# Patient Record
Sex: FEMALE | Race: WHITE | NOT HISPANIC OR LATINO | Employment: FULL TIME | URBAN - METROPOLITAN AREA
[De-identification: names, ages, dates, MRNs, and addresses within clinical notes are randomized per-mention and may not be internally consistent; named-entity substitution may affect disease eponyms.]

---

## 2019-04-16 ENCOUNTER — OFFICE VISIT (OUTPATIENT)
Dept: URGENT CARE | Facility: CLINIC | Age: 44
End: 2019-04-16
Payer: COMMERCIAL

## 2019-04-16 VITALS
TEMPERATURE: 98 F | DIASTOLIC BLOOD PRESSURE: 82 MMHG | OXYGEN SATURATION: 98 % | RESPIRATION RATE: 16 BRPM | SYSTOLIC BLOOD PRESSURE: 133 MMHG | HEART RATE: 73 BPM

## 2019-04-16 DIAGNOSIS — H10.33 ACUTE BACTERIAL CONJUNCTIVITIS OF BOTH EYES: Primary | ICD-10-CM

## 2019-04-16 PROCEDURE — 99213 OFFICE O/P EST LOW 20 MIN: CPT | Performed by: NURSE PRACTITIONER

## 2019-04-16 RX ORDER — TOBRAMYCIN 3 MG/ML
1 SOLUTION/ DROPS OPHTHALMIC
Qty: 1 BOTTLE | Refills: 0 | Status: SHIPPED | OUTPATIENT
Start: 2019-04-16 | End: 2021-06-22

## 2021-03-26 DIAGNOSIS — Z23 ENCOUNTER FOR IMMUNIZATION: ICD-10-CM

## 2021-06-22 ENCOUNTER — OFFICE VISIT (OUTPATIENT)
Dept: FAMILY MEDICINE CLINIC | Facility: CLINIC | Age: 46
End: 2021-06-22
Payer: COMMERCIAL

## 2021-06-22 VITALS
WEIGHT: 194.6 LBS | HEIGHT: 61 IN | OXYGEN SATURATION: 99 % | HEART RATE: 64 BPM | RESPIRATION RATE: 18 BRPM | BODY MASS INDEX: 36.74 KG/M2 | DIASTOLIC BLOOD PRESSURE: 78 MMHG | TEMPERATURE: 49.6 F | SYSTOLIC BLOOD PRESSURE: 114 MMHG

## 2021-06-22 DIAGNOSIS — R10.84 INTERMITTENT GENERALIZED ABDOMINAL PAIN: Primary | ICD-10-CM

## 2021-06-22 DIAGNOSIS — R11.10 INTERMITTENT VOMITING: ICD-10-CM

## 2021-06-22 DIAGNOSIS — Z76.89 ENCOUNTER TO ESTABLISH CARE: ICD-10-CM

## 2021-06-22 PROBLEM — I73.00 RAYNAUD'S DISEASE: Status: ACTIVE | Noted: 2021-06-22

## 2021-06-22 PROCEDURE — 99204 OFFICE O/P NEW MOD 45 MIN: CPT | Performed by: FAMILY MEDICINE

## 2021-06-22 RX ORDER — FOLIC ACID/MULTIVIT,IRON,MINER .4-18-35
1 TABLET,CHEWABLE ORAL DAILY
COMMUNITY

## 2021-06-22 NOTE — PROGRESS NOTES
Aurora Morales 1975 female MRN: [id number]  Nor[facility] Burnett [address]    Visit to Establish Care: Family Medicine    Assessment/Plan     No problem-specific Assessment & Plan notes found for this encounter  Lora was seen today for establish care  Diagnoses and all orders for this visit:    Intermittent generalized abdominal pain    Intermittent vomiting  -     CBC and differential; Future  -     Comprehensive metabolic panel; Future  -     TSH, 3rd generation with Free T4 reflex; Future  -     Lipid Panel with Direct LDL reflex; Future  -     UA w Reflex to Microscopic w Reflex to Culture -Lab Collect  -     US abdomen complete; Future    Encounter to establish care      Will start work-up with blood work, UA and ultrasound of the abdomen, if initial work-up is unrevealing will consider CT abdomen/pelvis  Advised if an episode recurs to seek emergency care  In addition to the above, the patient was counseled on general preventative health care subjects, including but not limited to:  - Nutrition, healthy weight, aerobic and weight-bearing exercise  - Mental health, social support, and self care  - Advised of the importance of dental hygiene and routine dental visits   - Patient made aware of  services at the office  SUBJECTIVE    HPI:  Aurora Morales is a 50 y o  female who presented to establish care with this family medicine practice  Abdominal pain- Comes on suddenly, feels like bloating, severe pain beneath both ribs and flank, of 4 times this has occurred 2 episodes she has vomited  The next day, they will be very tired  Last episode was in March, first episode was 1 year ago  Starts with some mild pain, then bloating, and then in an instant extremely painful  She has tried hot baths, hunching forward, Tums hasn't helped, nothing helps, also feels very pale, clammy and sweating  One time tried an enema, but didn't get relief from that   Lasted for 4 hours, vomits during this  No pinpoint abdominal pain location, involves entire abdomen and middle of back  Not cramping, severe pain, 8-9/10  No blood in vomit, normal bowel movements, no blood in stools  She has chills, no fevers  Last episode Friday night into Saturday  PHQ-9 Depression Screening    PHQ-9:   Frequency of the following problems over the past two weeks:      Little interest or pleasure in doing things: 0 - not at all  Feeling down, depressed, or hopeless: 0 - not at all  PHQ-2 Score: 0         Review of Systems   Constitutional: Negative for activity change, appetite change, chills, fatigue and fever  HENT: Negative for congestion and sore throat  Eyes: Negative for discharge and redness  Respiratory: Negative for cough, chest tightness, shortness of breath and wheezing  Cardiovascular: Negative for chest pain and palpitations  Gastrointestinal: Negative for abdominal pain, blood in stool, constipation, diarrhea, nausea and vomiting  Genitourinary: Negative for decreased urine volume, difficulty urinating and urgency  Musculoskeletal: Negative for back pain  Skin: Negative for rash  Neurological: Negative for dizziness, light-headedness and headaches  Psychiatric/Behavioral: Negative for dysphoric mood  The patient is not nervous/anxious  Historical Information   History reviewed  No pertinent past medical history  History reviewed  No pertinent surgical history    Family History   Problem Relation Age of Onset    CINTHYA disease Mother     Diabetes Father     Hypertension Father     Cholecystitis Maternal Grandmother      Social History     Socioeconomic History    Marital status: /Civil Union     Spouse name: Not on file    Number of children: Not on file    Years of education: Not on file    Highest education level: Not on file   Occupational History    Not on file   Tobacco Use    Smoking status: Never Smoker    Smokeless tobacco: Never Used   Substance and Sexual Activity    Alcohol use: Not on file    Drug use: Not on file    Sexual activity: Not on file   Other Topics Concern    Not on file   Social History Narrative    Not on file     Social Determinants of Health     Financial Resource Strain:     Difficulty of Paying Living Expenses:    Food Insecurity:     Worried About Running Out of Food in the Last Year:     [address] in the Last Year:    Transportation Needs:     Lack of Transportation (Medical):  Lack of Transportation (Non-Medical):    Physical Activity:     Days of Exercise per Week:     Minutes of Exercise per Session:    Stress:     Feeling of Stress :    Social Connections:     Frequency of Communication with Friends and Family:     Frequency of Social Gatherings with Friends and Family:     Attends [employer] Services:     Active Member of Clubs or Organizations:     Attends Club or Organization Meetings:     Marital Status:    Intimate Partner Violence:     Fear of Current or Ex-Partner:     Emotionally Abused:     Physically Abused:     Sexually Abused:      OB History    No obstetric history on file  Medications:      Current Outpatient Medications:     multivitamin-iron-minerals-folic acid (CENTRUM) chewable tablet, Chew 1 tablet daily, Disp: , Rfl:       Physical Exam:    Physical Exam  Vitals reviewed  Constitutional:       General: She is not in acute distress  Appearance: Normal appearance  She is not ill-appearing, toxic-appearing or diaphoretic  HENT:      Head: Normocephalic and atraumatic  Mouth/Throat:      Comments: Mask in place  Eyes:      General:         Right eye: No discharge  Left eye: No discharge  Extraocular Movements: Extraocular movements intact  Conjunctiva/sclera: Conjunctivae normal    Cardiovascular:      Rate and Rhythm: Normal rate and regular rhythm  Heart sounds: Normal heart sounds  No murmur heard  No friction rub  No gallop  Pulmonary:      Effort: Pulmonary effort is normal  No respiratory distress  Breath sounds: Normal breath sounds  No stridor  No wheezing or rhonchi  Abdominal:      General: Bowel sounds are normal  There is no distension  Palpations: Abdomen is soft  There is no mass  Tenderness: There is no abdominal tenderness  There is no right CVA tenderness, left CVA tenderness, guarding or rebound  Hernia: No hernia is present  Musculoskeletal:         General: No swelling, tenderness or signs of injury  Cervical back: Normal range of motion and neck supple  No rigidity  Skin:     General: Skin is warm  Coloration: Skin is not pale  Findings: No erythema or rash  Neurological:      Mental Status: She is alert and oriented to person, place, and time  Motor: No weakness  Psychiatric:         Mood and Affect: Mood normal          Behavior: Behavior normal             No future appointments        Emilee Jefferson DO  [address] [facility]

## 2021-06-25 ENCOUNTER — APPOINTMENT (OUTPATIENT)
Dept: LAB | Facility: CLINIC | Age: 46
End: 2021-06-25
Payer: COMMERCIAL

## 2021-06-25 DIAGNOSIS — R11.10 INTERMITTENT VOMITING: ICD-10-CM

## 2021-06-25 LAB
ALBUMIN SERPL BCP-MCNC: 3.3 G/DL (ref 3.5–5)
ALP SERPL-CCNC: 48 U/L (ref 46–116)
ALT SERPL W P-5'-P-CCNC: 17 U/L (ref 12–78)
ANION GAP SERPL CALCULATED.3IONS-SCNC: 4 MMOL/L (ref 4–13)
AST SERPL W P-5'-P-CCNC: 13 U/L (ref 5–45)
BACTERIA UR QL AUTO: ABNORMAL /HPF
BASOPHILS # BLD AUTO: 0.05 THOUSANDS/ΜL (ref 0–0.1)
BASOPHILS NFR BLD AUTO: 1 % (ref 0–1)
BILIRUB SERPL-MCNC: 0.39 MG/DL (ref 0.2–1)
BILIRUB UR QL STRIP: NEGATIVE
BUN SERPL-MCNC: 16 MG/DL (ref 5–25)
CALCIUM ALBUM COR SERPL-MCNC: 9.2 MG/DL (ref 8.3–10.1)
CALCIUM SERPL-MCNC: 8.6 MG/DL (ref 8.3–10.1)
CHLORIDE SERPL-SCNC: 111 MMOL/L (ref 100–108)
CHOLEST SERPL-MCNC: 219 MG/DL (ref 50–200)
CLARITY UR: ABNORMAL
CO2 SERPL-SCNC: 25 MMOL/L (ref 21–32)
COLOR UR: ABNORMAL
CREAT SERPL-MCNC: 0.62 MG/DL (ref 0.6–1.3)
EOSINOPHIL # BLD AUTO: 0.24 THOUSAND/ΜL (ref 0–0.61)
EOSINOPHIL NFR BLD AUTO: 3 % (ref 0–6)
ERYTHROCYTE [DISTWIDTH] IN BLOOD BY AUTOMATED COUNT: 14 % (ref 11.6–15.1)
GFR SERPL CREATININE-BSD FRML MDRD: 108 ML/MIN/1.73SQ M
GLUCOSE P FAST SERPL-MCNC: 87 MG/DL (ref 65–99)
GLUCOSE UR STRIP-MCNC: NEGATIVE MG/DL
HCT VFR BLD AUTO: 43.2 % (ref 34.8–46.1)
HDLC SERPL-MCNC: 69 MG/DL
HGB BLD-MCNC: 13.8 G/DL (ref 11.5–15.4)
HGB UR QL STRIP.AUTO: ABNORMAL
HYALINE CASTS #/AREA URNS LPF: ABNORMAL /LPF
IMM GRANULOCYTES # BLD AUTO: 0.02 THOUSAND/UL (ref 0–0.2)
IMM GRANULOCYTES NFR BLD AUTO: 0 % (ref 0–2)
KETONES UR STRIP-MCNC: ABNORMAL MG/DL
LDLC SERPL CALC-MCNC: 134 MG/DL (ref 0–100)
LEUKOCYTE ESTERASE UR QL STRIP: NEGATIVE
LYMPHOCYTES # BLD AUTO: 2.21 THOUSANDS/ΜL (ref 0.6–4.47)
LYMPHOCYTES NFR BLD AUTO: 24 % (ref 14–44)
MCH RBC QN AUTO: 28.9 PG (ref 26.8–34.3)
MCHC RBC AUTO-ENTMCNC: 31.9 G/DL (ref 31.4–37.4)
MCV RBC AUTO: 90 FL (ref 82–98)
MONOCYTES # BLD AUTO: 0.71 THOUSAND/ΜL (ref 0.17–1.22)
MONOCYTES NFR BLD AUTO: 8 % (ref 4–12)
NEUTROPHILS # BLD AUTO: 5.88 THOUSANDS/ΜL (ref 1.85–7.62)
NEUTS SEG NFR BLD AUTO: 64 % (ref 43–75)
NITRITE UR QL STRIP: NEGATIVE
NON-SQ EPI CELLS URNS QL MICRO: ABNORMAL /HPF
NRBC BLD AUTO-RTO: 0 /100 WBCS
PH UR STRIP.AUTO: 6 [PH]
PLATELET # BLD AUTO: 201 THOUSANDS/UL (ref 149–390)
PMV BLD AUTO: 10.8 FL (ref 8.9–12.7)
POTASSIUM SERPL-SCNC: 4 MMOL/L (ref 3.5–5.3)
PROT SERPL-MCNC: 7.1 G/DL (ref 6.4–8.2)
PROT UR STRIP-MCNC: NEGATIVE MG/DL
RBC # BLD AUTO: 4.78 MILLION/UL (ref 3.81–5.12)
RBC #/AREA URNS AUTO: ABNORMAL /HPF
SODIUM SERPL-SCNC: 140 MMOL/L (ref 136–145)
SP GR UR STRIP.AUTO: 1.03 (ref 1–1.03)
TRIGL SERPL-MCNC: 82 MG/DL
TSH SERPL DL<=0.05 MIU/L-ACNC: 1.05 UIU/ML (ref 0.36–3.74)
UROBILINOGEN UR QL STRIP.AUTO: 0.2 E.U./DL
WBC # BLD AUTO: 9.11 THOUSAND/UL (ref 4.31–10.16)
WBC #/AREA URNS AUTO: ABNORMAL /HPF

## 2021-06-25 PROCEDURE — 36415 COLL VENOUS BLD VENIPUNCTURE: CPT

## 2021-06-25 PROCEDURE — 80061 LIPID PANEL: CPT

## 2021-06-25 PROCEDURE — 80053 COMPREHEN METABOLIC PANEL: CPT

## 2021-06-25 PROCEDURE — 81001 URINALYSIS AUTO W/SCOPE: CPT | Performed by: FAMILY MEDICINE

## 2021-06-25 PROCEDURE — 84443 ASSAY THYROID STIM HORMONE: CPT

## 2021-06-25 PROCEDURE — 85025 COMPLETE CBC W/AUTO DIFF WBC: CPT

## 2021-07-01 ENCOUNTER — HOSPITAL ENCOUNTER (OUTPATIENT)
Dept: ULTRASOUND IMAGING | Facility: HOSPITAL | Age: 46
Discharge: HOME | End: 2021-07-01
Payer: COMMERCIAL

## 2021-07-01 DIAGNOSIS — R11.10 INTERMITTENT VOMITING: ICD-10-CM

## 2021-07-01 PROCEDURE — 76700 US EXAM ABDOM COMPLETE: CPT

## 2021-07-07 ENCOUNTER — OFFICE VISIT (OUTPATIENT)
Dept: FAMILY MEDICINE CLINIC | Facility: CLINIC | Age: 46
End: 2021-07-07
Payer: COMMERCIAL

## 2021-07-07 VITALS
RESPIRATION RATE: 18 BRPM | TEMPERATURE: 99 F | SYSTOLIC BLOOD PRESSURE: 132 MMHG | WEIGHT: 198.4 LBS | HEART RATE: 52 BPM | HEIGHT: 61 IN | OXYGEN SATURATION: 99 % | DIASTOLIC BLOOD PRESSURE: 82 MMHG | BODY MASS INDEX: 37.46 KG/M2

## 2021-07-07 DIAGNOSIS — Z12.31 ENCOUNTER FOR SCREENING MAMMOGRAM FOR MALIGNANT NEOPLASM OF BREAST: ICD-10-CM

## 2021-07-07 DIAGNOSIS — R31.21 ASYMPTOMATIC MICROSCOPIC HEMATURIA: ICD-10-CM

## 2021-07-07 DIAGNOSIS — Z00.00 ANNUAL PHYSICAL EXAM: Primary | ICD-10-CM

## 2021-07-07 DIAGNOSIS — R01.1 SYSTOLIC MURMUR: ICD-10-CM

## 2021-07-07 DIAGNOSIS — E78.5 HYPERLIPIDEMIA, UNSPECIFIED HYPERLIPIDEMIA TYPE: ICD-10-CM

## 2021-07-07 DIAGNOSIS — Z12.4 SCREENING FOR CERVICAL CANCER: ICD-10-CM

## 2021-07-07 DIAGNOSIS — Z23 ENCOUNTER FOR IMMUNIZATION: ICD-10-CM

## 2021-07-07 PROCEDURE — 99396 PREV VISIT EST AGE 40-64: CPT | Performed by: FAMILY MEDICINE

## 2021-07-07 PROCEDURE — 1036F TOBACCO NON-USER: CPT | Performed by: FAMILY MEDICINE

## 2021-07-07 PROCEDURE — 3725F SCREEN DEPRESSION PERFORMED: CPT | Performed by: FAMILY MEDICINE

## 2021-07-07 PROCEDURE — 99214 OFFICE O/P EST MOD 30 MIN: CPT | Performed by: FAMILY MEDICINE

## 2021-07-07 PROCEDURE — 90715 TDAP VACCINE 7 YRS/> IM: CPT | Performed by: FAMILY MEDICINE

## 2021-07-07 PROCEDURE — 81001 URINALYSIS AUTO W/SCOPE: CPT | Performed by: FAMILY MEDICINE

## 2021-07-07 PROCEDURE — 90471 IMMUNIZATION ADMIN: CPT | Performed by: FAMILY MEDICINE

## 2021-07-07 NOTE — PROGRESS NOTES
ADULT [facility]NUAL [facility]    NAME: Mary Gibbons  AGE: 50 y o  SEX: female  : 1975     DATE: 2021     Assessment and Plan:     Problem List Items Addressed This Visit        Other    Systolic murmur      Other Visit Diagnoses     Annual physical exam    -  Primary    Encounter for screening mammogram for malignant neoplasm of breast        Relevant Orders    Mammo screening bilateral w 3d & cad    Encounter for immunization        Relevant Orders    TDAP VACCINE GREATER THAN OR EQUAL TO 6YO IM    Screening for cervical cancer        Relevant Orders    Ambulatory referral to Gynecology          Immunizations and preventive care screenings were discussed with patient today  Appropriate education was printed on patient's after visit summary  Counseling:  Alcohol/drug use: discussed moderation in alcohol intake, the recommendations for healthy alcohol use, and avoidance of illicit drug use  Dental Health: discussed importance of regular tooth brushing, flossing, and dental visits  Injury prevention: discussed safety/seat belts, safety helmets, smoke detectors, carbon dioxide detectors, and smoking near bedding or upholstery  Sexual health: discussed sexually transmitted diseases, partner selection, use of condoms, avoidance of unintended pregnancy, and contraceptive alternatives  · Exercise: the importance of regular exercise/physical activity was discussed  Recommend exercise 3-5 times per week for at least 30 minutes  BMI Counseling: Body mass index is 37 49 kg/m²  The BMI is above normal  Nutrition recommendations include decreasing portion sizes, encouraging healthy choices of fruits and vegetables, decreasing fast food intake, consuming healthier snacks, moderation in carbohydrate intake and increasing intake of lean protein   Exercise recommendations include moderate physical activity 150 minutes/week and exercising 3-5 times per week  No follow-ups on file  Chief Complaint:     Chief Complaint   Patient presents with    Follow-up    Physical Exam      History of Present Illness:     Adult Annual Physical   Patient here for a comprehensive physical exam  The patient reports - see problem focused visit  Diet and Physical Activity  · Diet/Nutrition: well balanced diet, frequent junk food and consuming 3-5 servings of fruits/vegetables daily  · Exercise: walking, moderate cardiovascular exercise and 3-4 times a week on average  Depression Screening  PHQ-9 Depression Screening    PHQ-9:   Frequency of the following problems over the past two weeks:      Little interest or pleasure in doing things: 0 - not at all  Feeling down, depressed, or hopeless: 0 - not at all  PHQ-2 Score: 0       General Health  · Sleep: sleeps well  · Hearing: normal - bilateral   · Vision: no vision problems  · Dental: regular dental visits  /GYN Health  · Patient is: premenopausal  · Last menstrual period: LMP 6/24  · Contraceptive method: None,  with vasectomy, follows with GYN  Review of Systems:     Review of Systems - see problem focused visit  Past Medical History:     History reviewed  No pertinent past medical history  Past Surgical History:     History reviewed  No pertinent surgical history     Social History:     Social History     Socioeconomic History    Marital status: /Civil Union     Spouse name: None    Number of children: None    Years of education: None    Highest education level: None   Occupational History    None   Tobacco Use    Smoking status: Never Smoker    Smokeless tobacco: Never Used   Substance and Sexual Activity    Alcohol use: None    Drug use: None    Sexual activity: None   Other Topics Concern    None   Social History Narrative    None     Social Determinants of Health     Financial Resource Strain:     Difficulty of Paying Living Expenses:    Food Insecurity:     Worried About Running Out of Food in the Last Year:     [address] in the Last Year:    Transportation Needs:     Lack of Transportation (Medical):  Lack of Transportation (Non-Medical):    Physical Activity:     Days of Exercise per Week:     Minutes of Exercise per Session:    Stress:     Feeling of Stress :    Social Connections:     Frequency of Communication with Friends and Family:     Frequency of Social Gatherings with Friends and Family:     Attends [employer] Services:     Active Member of Clubs or Organizations:     Attends Club or Organization Meetings:     Marital Status:    Intimate Partner Violence:     Fear of Current or Ex-Partner:     Emotionally Abused:     Physically Abused:     Sexually Abused:       Family History:     Family History   Problem Relation Age of Onset    CINTHYA disease Mother     Diabetes Father     Hypertension Father     Cholecystitis Maternal Grandmother       Current Medications:     Current Outpatient Medications   Medication Sig Dispense Refill    multivitamin-iron-minerals-folic acid (CENTRUM) chewable tablet Chew 1 tablet daily       No current facility-administered medications for this visit  Allergies:     No Known Allergies   Physical Exam:     /82 (BP Location: Left arm, Patient Position: Sitting, Cuff Size: Standard)   Pulse (!) 52   Temp 99 °F (37 2 °C)   Resp 18   Ht 5' 1" (1 549 m)   Wt 90 kg (198 lb 6 4 oz)   LMP 06/24/2021   SpO2 99%   BMI 37 49 kg/m²     Physical Exam - see problem focused visit       [address] [facility]

## 2021-07-07 NOTE — PROGRESS NOTES
Assessment/Plan:       Problem List Items Addressed This Visit        Other    Systolic murmur      Other Visit Diagnoses     Annual physical exam    -  Primary    Encounter for screening mammogram for malignant neoplasm of breast        Relevant Orders    Mammo screening bilateral w 3d & cad    Encounter for immunization        Relevant Orders    TDAP VACCINE GREATER THAN OR EQUAL TO 8YO IM (Completed)    Screening for cervical cancer        Relevant Orders    Ambulatory referral to Gynecology    Hyperlipidemia, unspecified hyperlipidemia type        Asymptomatic microscopic hematuria        Relevant Orders    Urinalysis with microscopic            Subjective:      Patient ID: Marta Garvin is a 50 y o  female  HPI     The patient presents today for follow-up and to review recent results  Blood work reviewed and normal except for as outlined below:     Hyperlipidemia- Chol 219 Tri 82, HDL 69,   ASCVD risk score 0 6%  Diet modification discussed  Prior blood on UA, collected during menstrual period, will collect new sample  LMP 6/24  Will recollect urine sample  Abdominal ultrasound is still pending  No further episodes of abdominal pain  Will call with results  Systolic murmur incidentally noted on exam, asymptomatic  Will monitor at subsequent visits  BP slightly elevated today 132/82, monitor at subsequent visits  The following portions of the patient's history were reviewed and updated as appropriate: allergies, current medications, past family history, past medical history, past social history, past surgical history and problem list     Review of Systems   Constitutional: Negative for activity change, appetite change, chills and fever  Respiratory: Negative for cough, chest tightness, shortness of breath and wheezing  Cardiovascular: Negative for chest pain and palpitations     Gastrointestinal: Negative for abdominal pain, blood in stool, constipation, diarrhea, nausea and vomiting  Genitourinary: Negative for decreased urine volume, difficulty urinating and dysuria  Neurological: Negative for dizziness, light-headedness and headaches  Objective:      /82 (BP Location: Left arm, Patient Position: Sitting, Cuff Size: Standard)   Pulse (!) 52   Temp 99 °F (37 2 °C)   Resp 18   Ht 5' 1" (1 549 m)   Wt 90 kg (198 lb 6 4 oz)   LMP 06/24/2021   SpO2 99%   BMI 37 49 kg/m²          Physical Exam  Vitals reviewed  Constitutional:       General: She is not in acute distress  Appearance: Normal appearance  She is not ill-appearing, toxic-appearing or diaphoretic  HENT:      Head: Normocephalic and atraumatic  Cardiovascular:      Rate and Rhythm: Normal rate and regular rhythm  Heart sounds: Murmur heard  No friction rub  No gallop  Comments: Systolic murmur 2/6  Pulmonary:      Effort: Pulmonary effort is normal  No respiratory distress  Breath sounds: Normal breath sounds  No stridor  No wheezing or rhonchi  Musculoskeletal:      Right lower leg: No edema  Left lower leg: No edema  Skin:     General: Skin is warm  Coloration: Skin is not pale  Findings: No erythema or rash  Neurological:      Mental Status: She is alert and oriented to person, place, and time  Motor: No weakness     Psychiatric:         Mood and Affect: Mood normal          Behavior: Behavior normal            DO Carmella Bermudez [address] [place] [address]

## 2021-07-07 NOTE — PATIENT INSTRUCTIONS
Wellness Visit for Adults   AMBULATORY CARE:   A wellness visit  is when you see your healthcare provider to get screened for health problems  Your healthcare provider will also give you advice on how to stay healthy  Write down your questions so you remember to ask them  Ask your healthcare provider how often you should have a wellness visit  What happens at a wellness visit:  Your healthcare provider will ask about your health, and your family history of health problems  This includes high blood pressure, heart disease, and cancer  He or she will ask if you have symptoms that concern you, if you smoke, and about your mood  You may also be asked about your intake of medicines, supplements, food, and alcohol  Any of the following may be done:  · Your weight  will be checked  Your height may also be checked so your body mass index (BMI) can be calculated  Your BMI shows if you are at a healthy weight  · Your blood pressure  and heart rate will be checked  Your temperature may also be checked  · Blood and urine tests  may be done  Blood tests may be done to check your cholesterol levels  Abnormal cholesterol levels increase your risk for heart disease and stroke  You may also need a blood or urine test to check for diabetes if you are at increased risk  Urine tests may be done to look for signs of an infection or kidney disease  · A physical exam  includes checking your heartbeat and lungs with a stethoscope  Your healthcare provider may also check your skin to look for sun damage  · Screening tests  may be recommended  A screening test is done to check for diseases that may not cause symptoms  The screening tests you may need depend on your age, gender, family history, and lifestyle habits  For example, colorectal screening may be recommended if you are 48years old or older  Screening tests you need if you are a woman:   · A Pap smear  is used to screen for cervical cancer   Pap smears are usually done every 3 to 5 years depending on your age  You may need them more often if you have had abnormal Pap smear test results in the past  Ask your healthcare provider how often you should have a Pap smear  · A mammogram  is an x-ray of your breasts to screen for breast cancer  Experts recommend mammograms every 2 years starting at age 48 years  You may need a mammogram at age 52 years or younger if you have an increased risk for breast cancer  Talk to your healthcare provider about when you should start having mammograms and how often you need them  Vaccines you may need:   · Get an influenza vaccine  every year  The influenza vaccine protects you from the flu  Several types of viruses cause the flu  The viruses change over time, so new vaccines are made each year  · Get a tetanus-diphtheria (Td) booster vaccine  every 10 years  This vaccine protects you against tetanus and diphtheria  Tetanus is a severe infection that may cause painful muscle spasms and lockjaw  Diphtheria is a severe bacterial infection that causes a thick covering in the back of your mouth and throat  · Get a human papillomavirus (HPV) vaccine  if you are female and aged 23 to 32 or male 23 to 24 and never received it  This vaccine protects you from HPV infection  HPV is the most common infection spread by sexual contact  HPV may also cause vaginal, penile, and anal cancers  · Get a pneumococcal vaccine  if you are aged 72 years or older  The pneumococcal vaccine is an injection given to protect you from pneumococcal disease  Pneumococcal disease is an infection caused by pneumococcal bacteria  The infection may cause pneumonia, meningitis, or an ear infection  · Get a shingles vaccine  if you are 60 or older, even if you have had shingles before  The shingles vaccine is an injection to protect you from the varicella-zoster virus  This is the same virus that causes chickenpox   Shingles is a painful rash that develops in people who had chickenpox or have been exposed to the virus  How to eat healthy:  My Plate is a model for planning healthy meals  It shows the types and amounts of foods that should go on your plate  Fruits and vegetables make up about half of your plate, and grains and protein make up the other half  A serving of dairy is included on the side of your plate  The amount of calories and serving sizes you need depends on your age, gender, weight, and height  Examples of healthy foods are listed below:  · Eat a variety of vegetables  such as dark green, red, and orange vegetables  You can also include canned vegetables low in sodium (salt) and frozen vegetables without added butter or sauces  · Eat a variety of fresh fruits , canned fruit in 100% juice, frozen fruit, and dried fruit  · Include whole grains  At least half of the grains you eat should be whole grains  Examples include whole-wheat bread, wheat pasta, brown rice, and whole-grain cereals such as oatmeal     · Eat a variety of protein foods such as seafood (fish and shellfish), lean meat, and poultry without skin (turkey and chicken)  Examples of lean meats include pork leg, shoulder, or tenderloin, and beef round, sirloin, tenderloin, and extra lean ground beef  Other protein foods include eggs and egg substitutes, beans, peas, soy products, nuts, and seeds  · Choose low-fat dairy products such as skim or 1% milk or low-fat yogurt, cheese, and cottage cheese  · Limit unhealthy fats  such as butter, hard margarine, and shortening  Exercise:  Exercise at least 30 minutes per day on most days of the week  Some examples of exercise include walking, biking, dancing, and swimming  You can also fit in more physical activity by taking the stairs instead of the elevator or parking farther away from stores  Include muscle strengthening activities 2 days each week  Regular exercise provides many health benefits   It helps you manage your weight, and decreases your risk for type 2 diabetes, heart disease, stroke, and high blood pressure  Exercise can also help improve your mood  Ask your healthcare provider about the best exercise plan for you  General health and safety guidelines:   · Do not smoke  Nicotine and other chemicals in cigarettes and cigars can cause lung damage  Ask your healthcare provider for information if you currently smoke and need help to quit  E-cigarettes or smokeless tobacco still contain nicotine  Talk to your healthcare provider before you use these products  · Limit alcohol  A drink of alcohol is 12 ounces of beer, 5 ounces of wine, or 1½ ounces of liquor  · Lose weight, if needed  Being overweight increases your risk of certain health conditions  These include heart disease, high blood pressure, type 2 diabetes, and certain types of cancer  · Protect your skin  Do not sunbathe or use tanning beds  Use sunscreen with a SPF 15 or higher  Apply sunscreen at least 15 minutes before you go outside  Reapply sunscreen every 2 hours  Wear protective clothing, hats, and sunglasses when you are outside  · Drive safely  Always wear your seatbelt  Make sure everyone in your car wears a seatbelt  A seatbelt can save your life if you are in an accident  Do not use your cell phone when you are driving  This could distract you and cause an accident  Pull over if you need to make a call or send a text message  · Practice safe sex  Use latex condoms if are sexually active and have more than one partner  Your healthcare provider may recommend screening tests for sexually transmitted infections (STIs)  · Wear helmets, lifejackets, and protective gear  Always wear a helmet when you ride a bike or motorcycle, go skiing, or play sports that could cause a head injury  Wear protective equipment when you play sports  Wear a lifejacket when you are on a boat or doing water sports      © Copyright [employer] 2020 Information is for End User's use only and may not be sold, redistributed or otherwise used for commercial purposes  All illustrations and images included in CareNotes® are the copyrighted property of [employer] M [employer]  or Radha Salas  The above information is an  only  It is not intended as medical advice for individual conditions or treatments  Talk to your doctor, nurse or pharmacist before following any medical regimen to see if it is safe and effective for you  Cholesterol and Your Health   AMBULATORY CARE:   Cholesterol  is a waxy, fat-like substance  Your body uses cholesterol to make hormones and new cells, and to protect nerves  Cholesterol is made by your body  It also comes from certain foods you eat, such as meat and dairy products  Your healthcare provider can help you set goals for your cholesterol levels  He or she can help you create a plan to meet your goals  Cholesterol level goals: Your cholesterol level goals depend on your risk for heart disease, your age, and your other health conditions  The following are general guidelines:  · Total cholesterol  includes low-density lipoprotein (LDL), high-density lipoprotein (HDL), and triglyceride levels  The total cholesterol level should be lower than 200 mg/dL and is best at about 150 mg/dL  · LDL cholesterol  is called bad cholesterol  because it forms plaque in your arteries  As plaque builds up, your arteries become narrow, and less blood flows through  When plaque decreases blood flow to your heart, you may have chest pain  If plaque completely blocks an artery that brings blood to your heart, you may have a heart attack  Plaque can break off and form blood clots  Blood clots may block arteries in your brain and cause a stroke  The level should be less than 130 mg/dL and is best at about 100 mg/dL  · HDL cholesterol  is called good cholesterol  because it helps remove LDL cholesterol from your arteries   It does this by attaching to LDL cholesterol and carrying it to your liver  Your liver breaks down LDL cholesterol so your body can get rid of it  High levels of HDL cholesterol can help prevent a heart attack and stroke  Low levels of HDL cholesterol can increase your risk for heart disease, heart attack, and stroke  The level should be 60 mg/dL or higher  · Triglycerides  are a type of fat that store energy from foods you eat  High levels of triglycerides also cause plaque buildup  This can increase your risk for a heart attack or stroke  If your triglyceride level is high, your LDL cholesterol level may also be high  The level should be less than 150 mg/dL  What increases your risk for high cholesterol:   · Smoking cigarettes    · Being overweight or obese, or not getting enough exercise    · Drinking large amounts of alcohol    · A medical condition such as hypertension (high blood pressure) or diabetes    · Certain genes passed from your parents to you    · Age older than 65 years    What you need to know about having your cholesterol levels checked: Adults 21to 39years of age should have their cholesterol levels checked every 4 to 6 years  Adults 45 years or older should have their cholesterol checked every 1 to 2 years  You may need your cholesterol checked more often, or at a younger age, if you have risk factors for heart disease  You may also need to have your cholesterol checked more often if you have other health conditions, such as diabetes  Blood tests are used to check cholesterol levels  Blood tests measure your levels of triglycerides, LDL cholesterol, and HDL cholesterol  How healthy fats affect your cholesterol levels:  Healthy fats, also called unsaturated fats, help lower LDL cholesterol and triglyceride levels  Healthy fats include the following:  · Monounsaturated fats  are found in foods such as olive oil, canola oil, avocado, nuts, and olives      · Polyunsaturated fats,  such as omega 3 fats, are found in fish, such as salmon, trout, and tuna  They can also be found in plant foods such as flaxseed, walnuts, and soybeans  How unhealthy fats affect your cholesterol levels:  Unhealthy fats increase LDL cholesterol and triglyceride levels  They are found in foods high in cholesterol, saturated fat, and trans fat:  · Cholesterol  is found in eggs, dairy, and meat  · Saturated fat  is found in butter, cheese, ice cream, whole milk, and coconut oil  Saturated fat is also found in meat, such as sausage, hot dogs, and bologna  · Trans fat  is found in liquid oils and is used in fried and baked foods  Foods that contain trans fats include chips, crackers, muffins, sweet rolls, microwave popcorn, and cookies  Treatment  for high cholesterol will also decrease your risk of heart disease, heart attack, and stroke  Treatment may include any of the following:  · Lifestyle changes  may include food, exercise, weight loss, and quitting smoking  You may also need to decrease the amount of alcohol you drink  Your healthcare provider will want you to start with lifestyle changes  Other treatment may be added if lifestyle changes are not enough  · Medicines  may be given to lower your LDL cholesterol, triglyceride levels, or total cholesterol level  You may need medicines to lower your cholesterol if any of the following is true:     ? You have a history of stroke, TIA, unstable angina, or a heart attack  ? Your LDL cholesterol level is 190 mg/dL or higher  ? You are age 36 to 76 years, have diabetes or heart disease risk factors, and your LDL cholesterol is 70 mg/dL or higher  · Supplements  include fish oil, red yeast rice, and garlic  Fish oil may help lower your triglyceride and LDL cholesterol levels  It may also increase your HDL cholesterol level  Red yeast rice may help decrease your total cholesterol level and LDL cholesterol level  Garlic may help lower your total cholesterol level   Do not take these supplements without talking to your healthcare provider  Food changes you can make to lower your cholesterol levels:  A dietitian can help you create a healthy eating plan  He or she can show you how to read food labels and choose foods low in saturated fat, trans fats, and cholesterol  · Decrease the total amount of fat you eat  Choose lean meats, fat-free or 1% fat milk, and low-fat dairy products, such as yogurt and cheese  Try to limit or avoid red meats  Limit or do not eat fried foods or baked goods, such as cookies  · Replace unhealthy fats with healthy fats  Cook foods in olive oil or canola oil  Choose soft margarines that are low in saturated fat and trans fat  Seeds, nuts, and avocados are other examples of healthy fats  · Eat foods with omega-3 fats  Examples include salmon, tuna, mackerel, walnuts, and flaxseed  Eat fish 2 times per week  Pregnant women should not eat fish that have high levels of mercury, such as shark, swordfish, and mariana mackerel  · Increase the amount of high-fiber foods you eat  High-fiber foods can help lower your LDL cholesterol  Aim to get between 20 and 30 grams of fiber each day  Fruits and vegetables are high in fiber  Eat at least 5 servings each day  Other high-fiber foods are whole-grain or whole-wheat breads, pastas, or cereals, and brown rice  Eat 3 ounces of whole-grain foods each day  Increase fiber slowly  You may have abdominal discomfort, bloating, and gas if you add fiber to your diet too quickly  · Eat healthy protein foods  Examples include low-fat dairy products, skinless chicken and turkey, fish, and nuts  · Limit foods and drinks that are high in sugar  Your dietitian or healthcare provider can help you create daily limits for high-sugar foods and drinks  The limit may be lower if you have diabetes or another health condition  Limits can also help you lose weight if needed    Lifestyle changes you can make to lower your cholesterol levels:   · Maintain a healthy weight  Ask your healthcare provider what a healthy weight is for you  Ask him or her to help you create a weight loss plan if needed  Weight loss can decrease your total cholesterol and triglyceride levels  Weight loss may also help keep your blood pressure at a healthy level  · Exercise regularly  Exercise can help lower your total cholesterol level and maintain a healthy weight  Exercise can also help increase your HDL cholesterol level  Work with your healthcare provider to create an exercise program that is right for you  Get at least 30 to 40 minutes of moderate exercise most days of the week  Examples of exercise include brisk walking, swimming, or biking  · Do not smoke  Nicotine and other chemicals in cigarettes and cigars can raise your cholesterol levels  Ask your healthcare provider for information if you currently smoke and need help to quit  E-cigarettes or smokeless tobacco still contain nicotine  Talk to your healthcare provider before you use these products  · Limit or do not drink alcohol  Alcohol can increase your triglyceride levels  Ask your healthcare provider before you drink alcohol  Ask how much is okay for you to drink in 1 day or 1 week  © Copyright [address] Information is for End User's use only and may not be sold, redistributed or otherwise used for commercial purposes  All illustrations and images included in CareNotes® are the copyrighted property of [employer] M , Inc  or [address] [place], [place] [id number]   The above information is an  only  It is not intended as medical advice for individual conditions or treatments  Talk to your doctor, nurse or pharmacist before following any medical regimen to see if it is safe and effective for you

## 2021-07-08 LAB
BACTERIA UR QL AUTO: NORMAL /HPF
BILIRUB UR QL STRIP: NEGATIVE
CLARITY UR: CLEAR
COLOR UR: YELLOW
GLUCOSE UR STRIP-MCNC: NEGATIVE MG/DL
HGB UR QL STRIP.AUTO: NEGATIVE
HYALINE CASTS #/AREA URNS LPF: NORMAL /LPF
KETONES UR STRIP-MCNC: NEGATIVE MG/DL
LEUKOCYTE ESTERASE UR QL STRIP: NEGATIVE
NITRITE UR QL STRIP: NEGATIVE
NON-SQ EPI CELLS URNS QL MICRO: NORMAL /HPF
PH UR STRIP.AUTO: 6 [PH]
PROT UR STRIP-MCNC: NEGATIVE MG/DL
RBC #/AREA URNS AUTO: NORMAL /HPF
SP GR UR STRIP.AUTO: 1.01 (ref 1–1.03)
UROBILINOGEN UR QL STRIP.AUTO: 0.2 E.U./DL
WBC #/AREA URNS AUTO: NORMAL /HPF

## 2021-07-12 DIAGNOSIS — K80.20 CALCULUS OF GALLBLADDER WITHOUT CHOLECYSTITIS WITHOUT OBSTRUCTION: Primary | ICD-10-CM

## 2021-07-12 DIAGNOSIS — R11.10 INTERMITTENT VOMITING: ICD-10-CM

## 2021-07-12 DIAGNOSIS — R10.84 INTERMITTENT GENERALIZED ABDOMINAL PAIN: ICD-10-CM

## 2021-07-14 ENCOUNTER — CONSULT (OUTPATIENT)
Dept: SURGERY | Facility: CLINIC | Age: 46
End: 2021-07-14
Payer: COMMERCIAL

## 2021-07-14 VITALS
RESPIRATION RATE: 16 BRPM | WEIGHT: 196 LBS | DIASTOLIC BLOOD PRESSURE: 70 MMHG | BODY MASS INDEX: 37 KG/M2 | HEIGHT: 61 IN | HEART RATE: 74 BPM | TEMPERATURE: 98.8 F | SYSTOLIC BLOOD PRESSURE: 122 MMHG

## 2021-07-14 DIAGNOSIS — R10.84 INTERMITTENT GENERALIZED ABDOMINAL PAIN: ICD-10-CM

## 2021-07-14 DIAGNOSIS — K80.50 RECURRENT BILIARY COLIC: Primary | ICD-10-CM

## 2021-07-14 DIAGNOSIS — K80.20 CALCULUS OF GALLBLADDER WITHOUT CHOLECYSTITIS WITHOUT OBSTRUCTION: ICD-10-CM

## 2021-07-14 DIAGNOSIS — R11.10 INTERMITTENT VOMITING: ICD-10-CM

## 2021-07-14 PROCEDURE — 99204 OFFICE O/P NEW MOD 45 MIN: CPT | Performed by: SURGERY

## 2021-07-14 PROCEDURE — 1036F TOBACCO NON-USER: CPT | Performed by: SURGERY

## 2021-07-14 PROCEDURE — 3008F BODY MASS INDEX DOCD: CPT | Performed by: SURGERY

## 2021-07-14 RX ORDER — CHLORHEXIDINE GLUCONATE 4 G/100ML
SOLUTION TOPICAL DAILY PRN
Status: CANCELLED | OUTPATIENT
Start: 2021-08-06

## 2021-07-14 RX ORDER — SODIUM CHLORIDE, SODIUM LACTATE, POTASSIUM CHLORIDE, CALCIUM CHLORIDE 600; 310; 30; 20 MG/100ML; MG/100ML; MG/100ML; MG/100ML
125 INJECTION, SOLUTION INTRAVENOUS CONTINUOUS
Status: CANCELLED | OUTPATIENT
Start: 2021-08-06

## 2021-07-14 RX ORDER — HEPARIN SODIUM 5000 [USP'U]/ML
5000 INJECTION, SOLUTION INTRAVENOUS; SUBCUTANEOUS ONCE
Status: CANCELLED | OUTPATIENT
Start: 2021-08-06

## 2021-07-14 RX ORDER — CEFAZOLIN SODIUM 2 G/50ML
2000 SOLUTION INTRAVENOUS ONCE
Status: CANCELLED | OUTPATIENT
Start: 2021-08-06 | End: 2021-07-14

## 2021-07-15 NOTE — PROGRESS NOTES
H&P Exam - General Surgery   Lora Sow 50 y o  female MRN: [id number]   Encounter: [id number]    Assessment/Plan     45F with escalating biliary colic and ultrasound findings of cholelithiasis  Her clinical history is very consistent with gallbladder disease and we have offered her an elective laparoscopic cholecystectomy  We discussed the operation in detail with her and her  including the risks and benefits  The patient is interested in proceeding and informed consent was signed  She will follow a low fat diet in the mean time and call our office with recurrent symptoms  I reviewed her ultrasound imaging directly and discussed it with radiology, the liver is mildly enlarged and there are multiple liver cysts identified in addition to the gallstones  No other acute pathology noted  The patient was updated regarding the ultrasound findings and reassured that the liver findings are not worrisome at this point  History of Present Illness   Chief Complaint   Patient presents with    Abdominal Pain     gallstones and enlarged liver     Mrs Maverick Robles is a 50year old healthy female who is referred to me to be evaluated for biliary colic  She explains 4-5 episodes in the last year that are all similar to each other and have been increasing in frequency  She notes them in the evenings when she is laying in bed and she gets a constellation of symptoms that include upper abdominal pain radiating to the back with abdominal bloating, nausea, diaphoresis, and occasional vomiting  She will have chills and be unable to get comfortable during that time  It usually lasts 1-2 hours and she feels unwell for the next few hours  She has not gone to the emergency room for these symptoms  Her PCP ordered an outpatient ultrasound that I reviewed directly and it shows a contracted gallbladder with gallstones  She had eaten shortly before the images were obtained   She has incidentally noted liver cysts and a mildly enlarged liver as well  Denies reflux, heartburn  Never had prior colonoscopy or other GI evaluation  Maternal grandmother had gallbladder disease and mother has GERD and had intestinal surgery for malrotation  The patient has had no prior abdominal operations  Her last pain episode was in June  She has anxiety regarding recurrence of symptoms and is eager to proceed with cholecystectomy to prevent future pain episodes  Abdominal Pain  Associated symptoms include nausea and vomiting  Review of Systems   Constitutional: Positive for chills and diaphoresis  Gastrointestinal: Positive for abdominal pain, nausea and vomiting  All other systems reviewed and are negative  Historical Information   History reviewed  No pertinent past medical history  History reviewed  No pertinent surgical history  Social History   Social History     Substance and Sexual Activity   Alcohol Use None     Social History     Substance and Sexual Activity   Drug Use Not on file     Social History     Tobacco Use   Smoking Status Never Smoker   Smokeless Tobacco Never Used     Family History   Problem Relation Age of Onset    CINTHYA disease Mother     Diabetes Father     Hypertension Father     Cholecystitis Maternal Grandmother        Meds/Allergies     Current Outpatient Medications:     multivitamin-iron-minerals-folic acid (CENTRUM) chewable tablet, Chew 1 tablet daily, Disp: , Rfl:   No Known Allergies    The following portions of the patient's history were reviewed and updated as appropriate: allergies, current medications, past family history, past medical history, past social history, past surgical history and problem list     Objective   Current Vitals:   Blood pressure 122/70, pulse 74, temperature 98 8 °F (37 1 °C), resp  rate 16, height 5' 1" (1 549 m), weight 88 9 kg (196 lb), last menstrual period 06/24/2021  Physical Exam  Vitals and nursing note reviewed  Constitutional:       Appearance: Normal appearance  HENT:      Head: Normocephalic and atraumatic  Mouth/Throat:      Mouth: Mucous membranes are dry  Cardiovascular:      Rate and Rhythm: Normal rate  Pulmonary:      Effort: Pulmonary effort is normal  No respiratory distress  Abdominal:      General: There is no distension  Palpations: Abdomen is soft  There is no mass  Tenderness: There is no abdominal tenderness  There is no guarding or rebound  Hernia: No hernia is present  Skin:     General: Skin is warm and dry  Coloration: Skin is not jaundiced  Neurological:      General: No focal deficit present  Mental Status: She is alert and oriented to person, place, and time  Psychiatric:         Mood and Affect: Mood normal          Behavior: Behavior normal          Signature:   Maggy Max MD  Date: 7/15/2021 Time: 10:13 AM

## 2021-07-15 NOTE — H&P
H&P Exam - General Surgery   Lora Juares 50 y o  female MRN: [id number]   Encounter: [id number]    Assessment/Plan     45F with escalating biliary colic and ultrasound findings of cholelithiasis  Her clinical history is very consistent with gallbladder disease and we have offered her an elective laparoscopic cholecystectomy  We discussed the operation in detail with her and her  including the risks and benefits  The patient is interested in proceeding and informed consent was signed  She will follow a low fat diet in the mean time and call our office with recurrent symptoms  I reviewed her ultrasound imaging directly and discussed it with radiology, the liver is mildly enlarged and there are multiple liver cysts identified in addition to the gallstones  No other acute pathology noted  The patient was updated regarding the ultrasound findings and reassured that the liver findings are not worrisome at this point  History of Present Illness   Chief Complaint   Patient presents with    Abdominal Pain     gallstones and enlarged liver     Mrs Basil Stevens is a 50year old healthy female who is referred to me to be evaluated for biliary colic  She explains 4-5 episodes in the last year that are all similar to each other and have been increasing in frequency  She notes them in the evenings when she is laying in bed and she gets a constellation of symptoms that include upper abdominal pain radiating to the back with abdominal bloating, nausea, diaphoresis, and occasional vomiting  She will have chills and be unable to get comfortable during that time  It usually lasts 1-2 hours and she feels unwell for the next few hours  She has not gone to the emergency room for these symptoms  Her PCP ordered an outpatient ultrasound that I reviewed directly and it shows a contracted gallbladder with gallstones  She had eaten shortly before the images were obtained   She has incidentally noted liver cysts and a mildly enlarged liver as well  Denies reflux, heartburn  Never had prior colonoscopy or other GI evaluation  Maternal grandmother had gallbladder disease and mother has GERD and had intestinal surgery for malrotation  The patient has had no prior abdominal operations  Her last pain episode was in June  She has anxiety regarding recurrence of symptoms and is eager to proceed with cholecystectomy to prevent future pain episodes  Abdominal Pain  Associated symptoms include nausea and vomiting  Review of Systems   Constitutional: Positive for chills and diaphoresis  Gastrointestinal: Positive for abdominal pain, nausea and vomiting  All other systems reviewed and are negative  Historical Information   History reviewed  No pertinent past medical history  History reviewed  No pertinent surgical history  Social History   Social History     Substance and Sexual Activity   Alcohol Use None     Social History     Substance and Sexual Activity   Drug Use Not on file     Social History     Tobacco Use   Smoking Status Never Smoker   Smokeless Tobacco Never Used     Family History   Problem Relation Age of Onset    CINTHYA disease Mother     Diabetes Father     Hypertension Father     Cholecystitis Maternal Grandmother        Meds/Allergies     Current Outpatient Medications:     multivitamin-iron-minerals-folic acid (CENTRUM) chewable tablet, Chew 1 tablet daily, Disp: , Rfl:   No Known Allergies    The following portions of the patient's history were reviewed and updated as appropriate: allergies, current medications, past family history, past medical history, past social history, past surgical history and problem list     Objective   Current Vitals:   Blood pressure 122/70, pulse 74, temperature 98 8 °F (37 1 °C), resp  rate 16, height 5' 1" (1 549 m), weight 88 9 kg (196 lb), last menstrual period 06/24/2021  Physical Exam  Vitals and nursing note reviewed  Constitutional:       Appearance: Normal appearance  HENT:      Head: Normocephalic and atraumatic  Mouth/Throat:      Mouth: Mucous membranes are dry  Cardiovascular:      Rate and Rhythm: Normal rate  Pulmonary:      Effort: Pulmonary effort is normal  No respiratory distress  Abdominal:      General: There is no distension  Palpations: Abdomen is soft  There is no mass  Tenderness: There is no abdominal tenderness  There is no guarding or rebound  Hernia: No hernia is present  Skin:     General: Skin is warm and dry  Coloration: Skin is not jaundiced  Neurological:      General: No focal deficit present  Mental Status: She is alert and oriented to person, place, and time  Psychiatric:         Mood and Affect: Mood normal          Behavior: Behavior normal          Signature:   Prabhu Morales MD  Date: 7/15/2021 Time: 3:56 PM

## 2021-08-02 NOTE — PRE-PROCEDURE INSTRUCTIONS
Pre-Surgery Instructions:   Medication Instructions    multivitamin-iron-minerals-folic acid (CENTRUM) chewable tablet Instructed patient per Anesthesia Guidelines  All pre-op instructions reviewed as per Yuliana Kelly My Surgical Experience w/ pt verb understanding  Inst NPO post MN night before sx  Instructed to avoid all ASA/NSAIDs and OTC Vit/Supp from now until after surgery  Tylenol ok to take prn  Received pre-op showering instructions from office, has CHG, reviewed process at time of call  Current hospital visitor & masking policy reviewed  Knows will receive phone call w/ time to report btwn 2-8 pm day before surgery  Pt verbalized an understanding of all instructions reviewed and offers no concerns at this time

## 2021-08-05 ENCOUNTER — ANESTHESIA EVENT (OUTPATIENT)
Dept: PERIOP | Facility: HOSPITAL | Age: 46
End: 2021-08-05
Payer: COMMERCIAL

## 2021-08-06 ENCOUNTER — ANESTHESIA (OUTPATIENT)
Dept: PERIOP | Facility: HOSPITAL | Age: 46
End: 2021-08-06
Payer: COMMERCIAL

## 2021-08-06 ENCOUNTER — HOSPITAL ENCOUNTER (OUTPATIENT)
Facility: HOSPITAL | Age: 46
Setting detail: OUTPATIENT SURGERY
Discharge: HOME | End: 2021-08-06
Attending: SURGERY | Admitting: SURGERY
Payer: COMMERCIAL

## 2021-08-06 ENCOUNTER — APPOINTMENT (OUTPATIENT)
Dept: RADIOLOGY | Facility: HOSPITAL | Age: 46
End: 2021-08-06
Payer: COMMERCIAL

## 2021-08-06 VITALS
BODY MASS INDEX: 37 KG/M2 | HEIGHT: 61 IN | SYSTOLIC BLOOD PRESSURE: 114 MMHG | WEIGHT: 196 LBS | DIASTOLIC BLOOD PRESSURE: 67 MMHG | OXYGEN SATURATION: 96 % | RESPIRATION RATE: 18 BRPM | TEMPERATURE: 97.4 F | HEART RATE: 71 BPM

## 2021-08-06 DIAGNOSIS — K80.50 RECURRENT BILIARY COLIC: Primary | ICD-10-CM

## 2021-08-06 LAB
EXT PREGNANCY TEST URINE: NEGATIVE
EXT. CONTROL: NORMAL

## 2021-08-06 PROCEDURE — 88304 TISSUE EXAM BY PATHOLOGIST: CPT | Performed by: PATHOLOGY

## 2021-08-06 PROCEDURE — 74300 X-RAY BILE DUCTS/PANCREAS: CPT

## 2021-08-06 PROCEDURE — 47563 LAPARO CHOLECYSTECTOMY/GRAPH: CPT | Performed by: SURGERY

## 2021-08-06 PROCEDURE — 81025 URINE PREGNANCY TEST: CPT | Performed by: STUDENT IN AN ORGANIZED HEALTH CARE EDUCATION/TRAINING PROGRAM

## 2021-08-06 RX ORDER — CEFAZOLIN SODIUM 2 G/50ML
2000 SOLUTION INTRAVENOUS ONCE
Status: COMPLETED | OUTPATIENT
Start: 2021-08-06 | End: 2021-08-06

## 2021-08-06 RX ORDER — HYDROMORPHONE HCL/PF 1 MG/ML
0.5 SYRINGE (ML) INJECTION
Status: DISCONTINUED | OUTPATIENT
Start: 2021-08-06 | End: 2021-08-06 | Stop reason: HOSPADM

## 2021-08-06 RX ORDER — SODIUM CHLORIDE, SODIUM LACTATE, POTASSIUM CHLORIDE, CALCIUM CHLORIDE 600; 310; 30; 20 MG/100ML; MG/100ML; MG/100ML; MG/100ML
125 INJECTION, SOLUTION INTRAVENOUS CONTINUOUS
Status: DISCONTINUED | OUTPATIENT
Start: 2021-08-06 | End: 2021-08-06

## 2021-08-06 RX ORDER — METOCLOPRAMIDE HYDROCHLORIDE 5 MG/ML
10 INJECTION INTRAMUSCULAR; INTRAVENOUS ONCE AS NEEDED
Status: DISCONTINUED | OUTPATIENT
Start: 2021-08-06 | End: 2021-08-06 | Stop reason: HOSPADM

## 2021-08-06 RX ORDER — FENTANYL CITRATE 50 UG/ML
INJECTION, SOLUTION INTRAMUSCULAR; INTRAVENOUS AS NEEDED
Status: DISCONTINUED | OUTPATIENT
Start: 2021-08-06 | End: 2021-08-06

## 2021-08-06 RX ORDER — ACETAMINOPHEN 500 MG
1000 TABLET ORAL EVERY 8 HOURS
Refills: 0
Start: 2021-08-06

## 2021-08-06 RX ORDER — HEPARIN SODIUM 5000 [USP'U]/ML
5000 INJECTION, SOLUTION INTRAVENOUS; SUBCUTANEOUS ONCE
Status: COMPLETED | OUTPATIENT
Start: 2021-08-06 | End: 2021-08-06

## 2021-08-06 RX ORDER — SODIUM CHLORIDE, SODIUM LACTATE, POTASSIUM CHLORIDE, CALCIUM CHLORIDE 600; 310; 30; 20 MG/100ML; MG/100ML; MG/100ML; MG/100ML
125 INJECTION, SOLUTION INTRAVENOUS CONTINUOUS
Status: DISCONTINUED | OUTPATIENT
Start: 2021-08-06 | End: 2021-08-06 | Stop reason: HOSPADM

## 2021-08-06 RX ORDER — PROPOFOL 10 MG/ML
INJECTION, EMULSION INTRAVENOUS AS NEEDED
Status: DISCONTINUED | OUTPATIENT
Start: 2021-08-06 | End: 2021-08-06

## 2021-08-06 RX ORDER — FENTANYL CITRATE/PF 50 MCG/ML
50 SYRINGE (ML) INJECTION
Status: DISCONTINUED | OUTPATIENT
Start: 2021-08-06 | End: 2021-08-06 | Stop reason: HOSPADM

## 2021-08-06 RX ORDER — ONDANSETRON 2 MG/ML
INJECTION INTRAMUSCULAR; INTRAVENOUS AS NEEDED
Status: DISCONTINUED | OUTPATIENT
Start: 2021-08-06 | End: 2021-08-06

## 2021-08-06 RX ORDER — ROCURONIUM BROMIDE 10 MG/ML
INJECTION, SOLUTION INTRAVENOUS AS NEEDED
Status: DISCONTINUED | OUTPATIENT
Start: 2021-08-06 | End: 2021-08-06

## 2021-08-06 RX ORDER — NEOSTIGMINE METHYLSULFATE 1 MG/ML
INJECTION INTRAVENOUS AS NEEDED
Status: DISCONTINUED | OUTPATIENT
Start: 2021-08-06 | End: 2021-08-06

## 2021-08-06 RX ORDER — MAGNESIUM HYDROXIDE 1200 MG/15ML
LIQUID ORAL AS NEEDED
Status: DISCONTINUED | OUTPATIENT
Start: 2021-08-06 | End: 2021-08-06 | Stop reason: HOSPADM

## 2021-08-06 RX ORDER — MIDAZOLAM HYDROCHLORIDE 2 MG/2ML
INJECTION, SOLUTION INTRAMUSCULAR; INTRAVENOUS AS NEEDED
Status: DISCONTINUED | OUTPATIENT
Start: 2021-08-06 | End: 2021-08-06

## 2021-08-06 RX ORDER — SODIUM CHLORIDE, SODIUM LACTATE, POTASSIUM CHLORIDE, CALCIUM CHLORIDE 600; 310; 30; 20 MG/100ML; MG/100ML; MG/100ML; MG/100ML
125 INJECTION, SOLUTION INTRAVENOUS CONTINUOUS
Status: ACTIVE | OUTPATIENT
Start: 2021-08-06 | End: 2021-08-06

## 2021-08-06 RX ORDER — GLYCOPYRROLATE 0.2 MG/ML
INJECTION INTRAMUSCULAR; INTRAVENOUS AS NEEDED
Status: DISCONTINUED | OUTPATIENT
Start: 2021-08-06 | End: 2021-08-06

## 2021-08-06 RX ORDER — IBUPROFEN 200 MG
600 TABLET ORAL EVERY 8 HOURS SCHEDULED
Refills: 0
Start: 2021-08-06

## 2021-08-06 RX ORDER — KETOROLAC TROMETHAMINE 30 MG/ML
INJECTION, SOLUTION INTRAMUSCULAR; INTRAVENOUS AS NEEDED
Status: DISCONTINUED | OUTPATIENT
Start: 2021-08-06 | End: 2021-08-06

## 2021-08-06 RX ORDER — BUPIVACAINE HYDROCHLORIDE 5 MG/ML
INJECTION, SOLUTION EPIDURAL; INTRACAUDAL AS NEEDED
Status: DISCONTINUED | OUTPATIENT
Start: 2021-08-06 | End: 2021-08-06 | Stop reason: HOSPADM

## 2021-08-06 RX ORDER — CHLORHEXIDINE GLUCONATE 4 G/100ML
SOLUTION TOPICAL DAILY PRN
Status: DISCONTINUED | OUTPATIENT
Start: 2021-08-06 | End: 2021-08-06 | Stop reason: HOSPADM

## 2021-08-06 RX ADMIN — FENTANYL CITRATE 50 MCG: 50 INJECTION INTRAMUSCULAR; INTRAVENOUS at 07:35

## 2021-08-06 RX ADMIN — CEFAZOLIN SODIUM 2000 MG: 2 SOLUTION INTRAVENOUS at 07:15

## 2021-08-06 RX ADMIN — SODIUM CHLORIDE, SODIUM LACTATE, POTASSIUM CHLORIDE, AND CALCIUM CHLORIDE: .6; .31; .03; .02 INJECTION, SOLUTION INTRAVENOUS at 07:02

## 2021-08-06 RX ADMIN — ROCURONIUM BROMIDE 10 MG: 10 INJECTION INTRAVENOUS at 08:42

## 2021-08-06 RX ADMIN — FENTANYL CITRATE 25 MCG: 50 INJECTION INTRAMUSCULAR; INTRAVENOUS at 08:38

## 2021-08-06 RX ADMIN — HEPARIN SODIUM 5000 UNITS: 5000 INJECTION INTRAVENOUS; SUBCUTANEOUS at 06:39

## 2021-08-06 RX ADMIN — ONDANSETRON 4 MG: 2 INJECTION INTRAMUSCULAR; INTRAVENOUS at 09:16

## 2021-08-06 RX ADMIN — ROCURONIUM BROMIDE 40 MG: 10 INJECTION INTRAVENOUS at 07:35

## 2021-08-06 RX ADMIN — SODIUM CHLORIDE, SODIUM LACTATE, POTASSIUM CHLORIDE, AND CALCIUM CHLORIDE 125 ML/HR: .6; .31; .03; .02 INJECTION, SOLUTION INTRAVENOUS at 06:53

## 2021-08-06 RX ADMIN — FENTANYL CITRATE 25 MCG: 50 INJECTION INTRAMUSCULAR; INTRAVENOUS at 09:31

## 2021-08-06 RX ADMIN — NEOSTIGMINE METHYLSULFATE 3 MG: 1 INJECTION, SOLUTION INTRAVENOUS at 09:18

## 2021-08-06 RX ADMIN — GLYCOPYRROLATE 0.4 MG: 0.2 INJECTION, SOLUTION INTRAMUSCULAR; INTRAVENOUS at 09:18

## 2021-08-06 RX ADMIN — FENTANYL CITRATE 25 MCG: 50 INJECTION, SOLUTION INTRAMUSCULAR; INTRAVENOUS at 09:59

## 2021-08-06 RX ADMIN — MIDAZOLAM HYDROCHLORIDE 2 MG: 1 INJECTION, SOLUTION INTRAMUSCULAR; INTRAVENOUS at 07:30

## 2021-08-06 RX ADMIN — SODIUM CHLORIDE, SODIUM LACTATE, POTASSIUM CHLORIDE, AND CALCIUM CHLORIDE: .6; .31; .03; .02 INJECTION, SOLUTION INTRAVENOUS at 09:18

## 2021-08-06 RX ADMIN — FENTANYL CITRATE 25 MCG: 50 INJECTION INTRAMUSCULAR; INTRAVENOUS at 09:19

## 2021-08-06 RX ADMIN — FENTANYL CITRATE 25 MCG: 50 INJECTION INTRAMUSCULAR; INTRAVENOUS at 09:20

## 2021-08-06 RX ADMIN — KETOROLAC TROMETHAMINE 30 MG: 30 INJECTION, SOLUTION INTRAMUSCULAR; INTRAVENOUS at 09:16

## 2021-08-06 RX ADMIN — FENTANYL CITRATE 25 MCG: 50 INJECTION, SOLUTION INTRAMUSCULAR; INTRAVENOUS at 10:06

## 2021-08-06 RX ADMIN — PROPOFOL 200 MG: 10 INJECTION, EMULSION INTRAVENOUS at 07:35

## 2021-08-06 NOTE — NURSING NOTE
Pt has a reddened streak on her abdomen approximately 10 inches long  Pt states she has sunburn from lying out by her pool

## 2021-08-06 NOTE — ANESTHESIA POSTPROCEDURE EVALUATION
Post-Op Assessment Note    CV Status:  Stable  Pain Score: 0    Pain management: adequate     Mental Status:  Sleepy   Hydration Status:  Stable   PONV Controlled:  None   Airway Patency:  Patent      Post Op Vitals Reviewed: Yes      Staff: CRNA         No complications documented      BP   161/67   Temp  97 6   Pulse  49   Resp   22   SpO2   100

## 2021-08-06 NOTE — ANESTHESIA PREPROCEDURE EVALUATION
Procedure:  LAPAROSCOPIC CHOLECYSTECTOMY WITH INTRAOP CHOLANGIOGRAM (N/A Abdomen)    Relevant Problems   CARDIO   (+) Systolic murmur      NEURO/PSYCH   (+) Recurrent biliary colic        Physical Exam    Airway    Mallampati score: I  TM Distance: >3 FB  Neck ROM: full     Dental   No notable dental hx     Cardiovascular      Pulmonary      Other Findings        Anesthesia Plan  ASA Score- 2     Anesthesia Type- general with ASA Monitors  Additional Monitors:   Airway Plan: ETT  Plan Factors-Exercise tolerance (METS): >4 METS  Chart reviewed  Existing labs reviewed  Patient summary reviewed  Patient is not a current smoker  There is medical exclusion for perioperative obstructive sleep apnea risk education  Induction- intravenous  Postoperative Plan- Plan for postoperative opioid use  Informed Consent- Anesthetic plan and risks discussed with patient  I personally reviewed this patient with the CRNA  Discussed and agreed on the Anesthesia Plan with the CRNA  Eryn Adame Risks/benefits discussed with patient or patient surrogate/Vaccine Information Sheet (VIS) provided-VIS date: 8/07/15

## 2021-08-07 NOTE — OP NOTE
OPERATIVE REPORT  PATIENT NAME: Ritika Davis    :  1975  MRN: [id number]  Pt Location: [facility] OR ROOM 02    SURGERY DATE: 2021    Surgeon(s) and Role:     * Prabhu Morales MD - Primary     * Louisa Benavides MD - Assisting    Preop Diagnosis:  Recurrent biliary colic [A60 43]    Post-Op Diagnosis Codes:     * Recurrent biliary colic [K35 10]    Procedure(s) (LRB):  LAPAROSCOPIC CHOLECYSTECTOMY WITH INTRAOP CHOLANGIOGRAM (N/A)   Laparoscopic lysis of adhesions    Specimen(s):  ID Type Source Tests Collected by Time Destination   1 :  Tissue Gallbladder TISSUE EXAM Prabhu Morales MD 2021 [id number]        Estimated Blood Loss:   Minimal    Anesthesia Type:   General    Operative Indications:  Recurrent biliary colic [N45 92]  Ms Ty Ascencio is a 50year old female with a history of recurrent biliary colic  Her outpatient ultrasound study confirmed the presence of gallstones  She was consented for a laparoscopic cholecystectomy  Operative Findings:  Signs of chronic inflammation of the gallbladder, multiple gallstones within the gallbladder, evidence of adhesions to the right upper quadrant requiring lysis, normal intraoperative cholangiogram showing anterograde and retrograde flow, a smooth taper of the common bile duct, and no filling defects  Complications:   None    Procedure and Technique:  The patient was taken to the operating room where she was properly identified, monitored and anesthetized  She received antibiotics perioperatively  Venodyne's were placed prior to the induction of anesthesia and subcutaneous heparin was given for DVT prophylaxis  The abdomen was prepped and draped under sterile conditions using aseptic technique  Timeout was performed  Skin was incised at the umbilicus  Peritoneal cavity entered bluntly with a Katie clamp  12 mm trocar inserted and pneumoperitoneum established to 15 mmHg   4 quadrants of the abdomen were inspected laparoscopically and there were multiple adhesions to the anterior abdominal wall, lateral abdominal wall, and liver in the right upper quadrant, likely from prior inflammatory episodes secondary to the patient's gallbladder disease  2 additional working ports were placed  These were 5 mm ports in the epigastrium and right upper quadrant  The adhesions were taken down with a combination of blunt dissection and use of harmonic scalpel dissection  The third 5mm working port was placed after lysis of the adhesions was completed  The patient was placed in reverse Trendelenburg, left side down  Gallbladder was grasped at its dome retracted cephalad and to the right  Infundibulum was grasped and retracted laterally  Austell of Calot was defined and skeletonized  Cystic duct dissected out circumferentially  Calot's node was identified on the gallbladder and the cystic artery was seen running behind it  Cystic artery dissected out circumferentially and the critical view of safety was achieved  The cystic artery was short and seen coursing up into the gallbladder from the right hepatic artery that was in the inferior portion of our field running just under and behind the gallbladder entering the liver  The cystic artery was clipped twice below and divided above with harmonic deandre  A clip was placed on the up side of the cystic duct  A nick was made in the cystic duct and a cholangiocatheter was advanced and secured  Fluoroscopic cholangiogram was performed with the above findings noted  Cholangiocatheter was removed  Cystic duct clipped twice on the down side and divided between clips  Gallbladder dissected off the liver with harmonic deandre and hook electrocautery  The gallbladder was placed in an Endobag and delivered through the umbilical trocar site  Pneumoperitoneum reestablished to 15 mmHg  Scope advanced and the right upper quadrant inspected  Good hemostasis found  The procedure was completed with removal of all ports   The fascial defect at the umbilicus was closed with a figure-of-eight suture of 0 Vicryl  Skin was closed with subcuticular 4-0 Monocryl suture  Wounds infiltrated with half percent Marcaine  The wounds were dressed  The patient extubated and taken to recovery in stable condition   I was present for the entire procedure    Patient Disposition:  PACU     SIGNATURE: Li Beck MD  DATE: August 6, 2021  TIME: 8:41 PM

## 2021-08-23 ENCOUNTER — OFFICE VISIT (OUTPATIENT)
Dept: SURGERY | Facility: CLINIC | Age: 46
End: 2021-08-23

## 2021-08-23 VITALS
RESPIRATION RATE: 16 BRPM | DIASTOLIC BLOOD PRESSURE: 72 MMHG | TEMPERATURE: 98.3 F | SYSTOLIC BLOOD PRESSURE: 122 MMHG | WEIGHT: 199.6 LBS | HEART RATE: 64 BPM | BODY MASS INDEX: 37.69 KG/M2 | HEIGHT: 61 IN

## 2021-08-23 DIAGNOSIS — Z90.49 STATUS POST LAPAROSCOPIC CHOLECYSTECTOMY: Primary | ICD-10-CM

## 2021-08-23 PROBLEM — K80.50 RECURRENT BILIARY COLIC: Status: RESOLVED | Noted: 2021-07-14 | Resolved: 2021-08-23

## 2021-08-23 PROCEDURE — 99024 POSTOP FOLLOW-UP VISIT: CPT | Performed by: SURGERY

## 2021-08-23 PROCEDURE — 3008F BODY MASS INDEX DOCD: CPT | Performed by: SURGERY

## 2021-08-23 NOTE — PROGRESS NOTES
Post-Op Note - General Surgery   Lora Glover 50 y o  female MRN: [id number]  Encounter: [id number]    Assessment/Plan     45F status post laparoscopic cholecystectomy for recurrent biliary colic, recovering well  Pathology consistent with benign disease  Signs of prior inflammatory events based on intraoperative findings and on final pathology, supporting the clinical suspicion of recurrent biliary colic  Patient has been feeling well  Incisions healing well  Incidental question of long common channel at ampulla of Vater prior to dividing into biliary and pancreatic ductal systems based on radiology read of intraoperative cholangiogram images  Reviewed this possible anatomic variation with the patient, explained if there are no clinical symptoms, there is no need for further workup or evaluation  Should she have pancreatitis in the future and an underlying cause is not elucidated, she may need an MRCP at that time to further characterize the biliary and pancreatic ductal anatomy  She is in agreement to only investigate this incidental finding should it become clinically relevant for her  She can follow up with our office on an as needed basis  Subjective      Chief Complaint   Patient presents with    Post-op     lap cholecystectomy  Eating well, no nausea, vomiting  No abdominal pain  No recurrent attacks of pain from prior  Normal bowel and bladder function  No fevers  No jaundice  No issues with incisions  Review of Systems   Constitutional: Negative for activity change, appetite change, chills, fatigue and fever  Gastrointestinal: Negative for abdominal pain, constipation, diarrhea and vomiting  Genitourinary: Negative for difficulty urinating  All other systems reviewed and are negative        The following portions of the patient's history were reviewed and updated as appropriate: allergies, current medications, past family history, past medical history, past social history, past surgical history and problem list     Objective      Blood pressure 122/72, pulse 64, temperature 98 3 °F (36 8 °C), temperature source Tympanic, resp  rate 16, height 5' 1" (1 549 m), weight 90 5 kg (199 lb 9 6 oz), last menstrual period 08/06/2021  Physical Exam  Vitals reviewed  Constitutional:       Appearance: Normal appearance  HENT:      Head: Normocephalic and atraumatic  Mouth/Throat:      Mouth: Mucous membranes are moist    Cardiovascular:      Rate and Rhythm: Normal rate  Pulmonary:      Effort: Pulmonary effort is normal  No respiratory distress  Abdominal:      General: Abdomen is flat  There is no distension  Palpations: Abdomen is soft  Tenderness: There is no abdominal tenderness  Comments: Incisions healing well, no hernias, no erythema or drainage, skin glue peeled off   Skin:     General: Skin is warm and dry  Coloration: Skin is not jaundiced  Neurological:      General: No focal deficit present  Mental Status: She is alert and oriented to person, place, and time  Signature:   Markie Blum MD  Date: 8/23/2021 Time: 1:22 PM

## 2021-12-08 ENCOUNTER — VBI (OUTPATIENT)
Dept: ADMINISTRATIVE | Facility: OTHER | Age: 46
End: 2021-12-08

## 2022-01-10 ENCOUNTER — APPOINTMENT (OUTPATIENT)
Dept: RADIOLOGY | Facility: CLINIC | Age: 47
End: 2022-01-10
Payer: COMMERCIAL

## 2022-01-10 ENCOUNTER — APPOINTMENT (OUTPATIENT)
Dept: URGENT CARE | Facility: CLINIC | Age: 47
End: 2022-01-10
Payer: COMMERCIAL

## 2022-01-10 ENCOUNTER — OFFICE VISIT (OUTPATIENT)
Dept: FAMILY MEDICINE CLINIC | Facility: CLINIC | Age: 47
End: 2022-01-10
Payer: COMMERCIAL

## 2022-01-10 VITALS
TEMPERATURE: 98.7 F | BODY MASS INDEX: 39.12 KG/M2 | HEART RATE: 72 BPM | RESPIRATION RATE: 18 BRPM | DIASTOLIC BLOOD PRESSURE: 82 MMHG | HEIGHT: 61 IN | OXYGEN SATURATION: 98 % | SYSTOLIC BLOOD PRESSURE: 128 MMHG | WEIGHT: 207.2 LBS

## 2022-01-10 DIAGNOSIS — G89.29 CHRONIC PAIN OF LEFT KNEE: ICD-10-CM

## 2022-01-10 DIAGNOSIS — M25.562 CHRONIC PAIN OF LEFT KNEE: ICD-10-CM

## 2022-01-10 DIAGNOSIS — G89.29 CHRONIC PAIN OF LEFT KNEE: Primary | ICD-10-CM

## 2022-01-10 DIAGNOSIS — M25.562 CHRONIC PAIN OF LEFT KNEE: Primary | ICD-10-CM

## 2022-01-10 PROCEDURE — 3725F SCREEN DEPRESSION PERFORMED: CPT | Performed by: FAMILY MEDICINE

## 2022-01-10 PROCEDURE — 73562 X-RAY EXAM OF KNEE 3: CPT

## 2022-01-10 PROCEDURE — 3008F BODY MASS INDEX DOCD: CPT | Performed by: FAMILY MEDICINE

## 2022-01-10 PROCEDURE — 99214 OFFICE O/P EST MOD 30 MIN: CPT | Performed by: FAMILY MEDICINE

## 2022-01-10 PROCEDURE — 1036F TOBACCO NON-USER: CPT | Performed by: FAMILY MEDICINE

## 2022-01-10 NOTE — PATIENT INSTRUCTIONS
Knee Exercises   AMBULATORY CARE:   What you need to know about knee exercises:  Knee exercises help strengthen the muscles around your knee  Strong muscles can help reduce pain and decrease your risk of future injury  Knee exercises also help you heal after an injury or surgery  · Start slow  These are beginning exercises  Ask your healthcare provider if you need to see a physical therapist for more advanced exercises  As you get stronger, you may be able to do more sets of each exercise or add weights  · Stop if you feel pain  It is normal to feel some discomfort at first  Regular exercise will help decrease your discomfort over time  · Do the exercises on both legs  Do this so both knees remain strong  · Warm up before you do knee exercises  Walk or ride a stationary bike for 5 or 10 minutes to warm your muscles  How to perform knee stretches safely:  Always stretch before you do strengthening exercises  Do these stretching exercises again after you do the strengthening exercises  Do these stretches 4 or 5 days a week, or as directed  · Standing calf stretch: Face a wall and place both palms flat on the wall, or hold the back of a chair for balance  Keep a slight bend in your knees  Take a big step backward with one leg  Keep your other leg directly under you  Keep both heels flat and press your hips forward  Hold the stretch for 30 seconds, and then relax for 30 seconds  Switch legs  Repeat 2 or 3 times on each leg  · Standing quadriceps stretch:  Stand and place one hand against a wall or hold the back of a chair for balance  With your weight on one leg, bend your other leg and grab your ankle  Bring your heel toward your buttocks  Hold the stretch for 30 to 60 seconds  Switch legs  Repeat 2 or 3 times on each leg  · Sitting hamstring stretch:  Sit with both legs straight in front of you  Do not point or flex your toes   Place your palms on the floor and slide your hands forward until you feel the stretch  Do not round your back  Hold the stretch for 30 seconds  Repeat 2 or 3 times  How to perform knee strengthening exercises safely:  Do these exercises 4 or 5 days a week, or as directed  · Standing half squats:  Stand with your feet shoulder-width apart  Lean your back against a wall or hold the back of a chair for balance, if needed  Slowly sit down about 10 inches, as if you are going to sit in a chair  Your body weight should be mostly over your heels  Hold the squat for 5 seconds, then rise to a standing position  Do 3 sets of 10 squats to strengthen your buttocks and thighs  · Standing hamstring curls: Face a wall and place both palms flat on the wall, or hold the back of a chair for balance  With your weight on one leg, lift your other foot as close to your buttocks as you can  Hold for 5 seconds and then lower your leg  Do 2 sets of 10 curls on each leg  This exercise strengthens the muscles in the back of your thigh  · Standing calf raises:  Face a wall and place both palms flat on the wall, or hold the back of a chair for balance  Stand up straight, and do not lean  Place all your weight on one leg by lifting the other foot off the floor  Raise the heel of the foot that is on the floor as high as you can and then lower it  Do 2 sets of 10 calf raises on each leg to strengthen your calf muscles  · Straight leg lifts:  Lie on your stomach with straight legs  Fold your arms in front of you and rest your head in your arms  Tighten your leg muscles and raise one leg as high as you can  Hold for 5 seconds, then lower your leg  Do 2 sets of 10 lifts on each leg to strengthen your buttocks  · Sitting leg lifts:  Sit in a chair  Slowly straighten and raise one leg  Squeeze your thigh muscles and hold for 5 seconds  Relax and return your foot to the floor  Do 2 sets of 10 lifts on each leg   This helps strengthen the muscles in the front of your thigh  Contact your healthcare provider if:   · You have new pain or your pain becomes worse  · You have questions or concerns about your condition or care  © Copyright [employer] 2021 Information is for End User's use only and may not be sold, redistributed or otherwise used for commercial purposes  All illustrations and images included in CareNotes® are the copyrighted property of A D A M , Inc  or [facility] Tanesha Aragon   The above information is an  only  It is not intended as medical advice for individual conditions or treatments  Talk to your doctor, nurse or pharmacist before following any medical regimen to see if it is safe and effective for you

## 2022-01-10 NOTE — PROGRESS NOTES
Assessment/Plan:       Problem List Items Addressed This Visit        Other    Chronic pain of left knee - Primary     - Discussed lateral patellar tracking likely contributing to pain, likely component of arthritis given crepitus on exam, and possibly component of medial meniscus pathology given pain with Thessaly   - Recommended supportive care and PT  - XR knee   - Follow-up in 6 weeks, sooner as needed          Relevant Orders    XR knee 3 vw left non injury    Ambulatory referral to Physical Therapy            Subjective:      Patient ID: Odelia Meigs is a 48 y o  female  HPI     Since last visit s/p cholecystectomy, following with general surgery Dr Mckeon Massing  Occasional foods triggers stomach upset, rare  No recurrence of pain like she had prior to surgery  Last episode was awhile go  Advised to let us know of any concerns  Knee pain- Today, she is complaining of knee pain for awhile, unsure how long  Now painful with walking  Joined fitness center and it is limiting her exercise ability  Mostly left side, sometimes right side but moreso tired feeling on the right  Mostly with bending knee all the way back, medial portion of knee pain, picking leg up to put on sock/twisting motion triggers it  No locking  No prior truma tro knee  Range of motion is okay, pain with flexing  She notices mild swelling, entire left leg more swollen  The asymmetry has been present at least 6 months  No weakness, more so avoiding it due to pain  The following portions of the patient's history were reviewed and updated as appropriate: allergies, current medications, past family history, past medical history, past social history, past surgical history, and problem list     Review of Systems   All other systems reviewed and are negative          Objective:      /82 (BP Location: Left arm, Patient Position: Sitting, Cuff Size: Large)   Pulse 72   Temp 98 7 °F (37 1 °C)   Resp 18   Ht 5' 1" (1 549 m)   Wt 94 kg (207 lb 3 2 oz)   SpO2 98%   BMI 39 15 kg/m²          Physical Exam  Vitals reviewed  Constitutional:       General: She is not in acute distress  Appearance: Normal appearance  She is not ill-appearing, toxic-appearing or diaphoretic  Pulmonary:      Effort: Pulmonary effort is normal  No respiratory distress  Musculoskeletal:      Left knee: Crepitus present  No swelling, deformity, effusion, erythema, ecchymosis or bony tenderness  Normal range of motion  Tenderness present over the medial joint line  No lateral joint line or patellar tendon tenderness  No LCL laxity, MCL laxity, ACL laxity or PCL laxity  Abnormal meniscus and abnormal patellar mobility  Comments: Mild pain with Thessaly test left  Lateral patellar tracking with knee flexion and extension left  Skin:     General: Skin is warm  Coloration: Skin is not pale  Findings: No erythema or rash  Neurological:      Mental Status: She is alert and oriented to person, place, and time  Motor: No weakness     Psychiatric:         Mood and Affect: Mood normal          Behavior: Behavior normal              DO Carmella Coughlin [address] [place] [address] Primary Care

## 2022-01-11 PROBLEM — G89.29 CHRONIC PAIN OF LEFT KNEE: Status: ACTIVE | Noted: 2022-01-11

## 2022-01-11 PROBLEM — M25.562 CHRONIC PAIN OF LEFT KNEE: Status: ACTIVE | Noted: 2022-01-11

## 2022-01-12 NOTE — ASSESSMENT & PLAN NOTE
- Discussed lateral patellar tracking likely contributing to pain, likely component of arthritis given crepitus on exam, and possibly component of medial meniscus pathology given pain with Thessaly   - Recommended supportive care and PT  - XR knee   - Follow-up in 6 weeks, sooner as needed

## 2022-12-08 ENCOUNTER — VBI (OUTPATIENT)
Dept: ADMINISTRATIVE | Facility: OTHER | Age: 47
End: 2022-12-08

## 2023-07-17 ENCOUNTER — VBI (OUTPATIENT)
Dept: ADMINISTRATIVE | Facility: OTHER | Age: 48
End: 2023-07-17

## 2023-09-29 ENCOUNTER — VBI (OUTPATIENT)
Dept: ADMINISTRATIVE | Facility: OTHER | Age: 48
End: 2023-09-29

## 2023-12-16 ENCOUNTER — VBI (OUTPATIENT)
Dept: ADMINISTRATIVE | Facility: OTHER | Age: 48
End: 2023-12-16

## 2024-04-01 ENCOUNTER — OFFICE VISIT (OUTPATIENT)
Dept: URGENT CARE | Facility: CLINIC | Age: 49
End: 2024-04-01
Payer: COMMERCIAL

## 2024-04-01 VITALS
TEMPERATURE: 98.3 F | SYSTOLIC BLOOD PRESSURE: 145 MMHG | HEART RATE: 85 BPM | BODY MASS INDEX: 40.29 KG/M2 | HEIGHT: 61 IN | WEIGHT: 213.4 LBS | OXYGEN SATURATION: 98 % | DIASTOLIC BLOOD PRESSURE: 78 MMHG | RESPIRATION RATE: 18 BRPM

## 2024-04-01 DIAGNOSIS — J01.40 ACUTE NON-RECURRENT PANSINUSITIS: Primary | ICD-10-CM

## 2024-04-01 PROCEDURE — 99213 OFFICE O/P EST LOW 20 MIN: CPT | Performed by: NURSE PRACTITIONER

## 2024-04-01 RX ORDER — AMOXICILLIN 875 MG/1
875 TABLET, COATED ORAL 2 TIMES DAILY
Qty: 20 TABLET | Refills: 0 | Status: SHIPPED | OUTPATIENT
Start: 2024-04-01 | End: 2024-04-11

## 2024-04-01 RX ORDER — NITROFURANTOIN 25; 75 MG/1; MG/1
CAPSULE ORAL
COMMUNITY
Start: 2024-03-21

## 2024-04-01 NOTE — PROGRESS NOTES
[facility]        NAME: Lora Gracia is a 48 y.o. female  : 1975    MRN: [id number]  DATE: 2024  TIME: 10:43 AM      Assessment and Plan     Acute non-recurrent pansinusitis [J01.40]  1. Acute non-recurrent pansinusitis  amoxicillin (AMOXIL) 875 mg tablet            Patient Instructions     Patient Instructions   Sinusitis   AMBULATORY CARE:   Sinusitis  is inflammation or infection of your sinuses. Sinusitis is most often caused by a virus. Acute sinusitis may last up to 12 weeks. Chronic sinusitis lasts longer than 12 weeks. Recurrent sinusitis means you have 4 or more infections in 1 year.        Common signs and symptoms:   Fever    Pain, pressure, redness, or swelling around the forehead, cheeks, or eyes    Thick yellow or green discharge from your nose    Tenderness when you touch your face over your sinuses    Dry cough that happens mostly at night or when you lie down    Headache and face pain that is worse when you lean forward    Tooth pain, or pain when you chew    Seek care immediately if:   You have trouble breathing or wheezing that is getting worse.    You have a stiff neck, a fever, or a bad headache.     You cannot open your eye.     Your eyeball bulges out or you cannot move your eye.     You are more sleepy than normal, or you notice changes in your ability to think, move, or talk.    You have swelling of your forehead or scalp.    Call your doctor if:   You have vision changes, such as double vision.    Your eye and eyelid are red, swollen, and painful.     Your symptoms do not improve or go away after 10 days.    You have nausea and are vomiting.    Your nose is bleeding.    You have questions or concerns about your condition or care.    Medicines:  Your symptoms may go away on their own. Your healthcare provider may recommend watchful waiting for up to 10 days before starting antibiotics. You may need any of the following:  Acetaminophen  decreases pain and fever.  It is available without a doctor's order. Ask how much to take and how often to take it. Follow directions. Read the labels of all other medicines you are using to see if they also contain acetaminophen, or ask your doctor or pharmacist. Acetaminophen can cause liver damage if not taken correctly.    NSAIDs , such as ibuprofen, help decrease swelling, pain, and fever. This medicine is available with or without a doctor's order. NSAIDs can cause stomach bleeding or kidney problems in certain people. If you take blood thinner medicine, always ask your healthcare provider if NSAIDs are safe for you. Always read the medicine label and follow directions.    Nasal steroid sprays  may help decrease inflammation in your nose and sinuses.    Decongestants  help reduce swelling and drain mucus in the nose and sinuses. They may help you breathe easier.     Antihistamines  help dry mucus in the nose and relieve sneezing.     Antibiotics  help treat or prevent a bacterial infection.    Self-care:   Rinse your sinuses as directed.  Use a sinus rinse device to rinse your nasal passages with a saline (salt water) solution or distilled water. Do not use tap water. This will help thin the mucus in your nose and rinse away pollen and dirt. It will also help reduce swelling so you can breathe normally.    Use a humidifier  to increase air moisture in your home. This may make it easier for you to breathe and help decrease your cough.     Sleep with your head elevated.  Place an extra pillow under your head before you go to sleep to help your sinuses drain.     Drink liquids as directed.  Ask your healthcare provider how much liquid to drink each day and which liquids are best for you. Liquids will thin the mucus in your nose and help it drain. Avoid drinks that contain alcohol or caffeine.     Do not smoke, and avoid secondhand smoke.  Nicotine and other chemicals in cigarettes and cigars can make your symptoms worse. Ask your  healthcare provider for information if you currently smoke and need help to quit. E-cigarettes or smokeless tobacco still contain nicotine. Talk to your healthcare provider before you use these products.    Prevent the spread of germs:   Wash your hands often with soap and water.  Wash your hands after you use the bathroom, change a child's diaper, or sneeze. Wash your hands before you prepare or eat food.         Stay away from people who are sick.  Some germs spread easily and quickly through contact.    Follow up with your doctor as directed:  You may be referred to an ear, nose, and throat specialist. Write down your questions so you remember to ask them during your visits.   © Copyright [employer] 2023 Information is for End User's use only and may not be sold, redistributed or otherwise used for commercial purposes.  The above information is an  only. It is not intended as medical advice for individual conditions or treatments. Talk to your doctor, nurse or pharmacist before following any medical regimen to see if it is safe and effective for you.      Follow up with PCP in 3-5 days.  Proceed to  ER if symptoms worsen.    Chief Complaint     Chief Complaint   Patient presents with    Cold Like Symptoms    Earache     Symptoms started beginning in March went a way and then started back up again 2 days ago.          History of Present Illness     Congested everywhere.  Tried to do a nasal rinse and b/l ears started hurting afterwards.  Had something similar a few weeks ago that she fought through with otc meds but never completely went away.  No hx allergies.  Had a sinus infection last year (seen at [facility]/telemedicine); no hx recurring sinusitis.     Earache         Review of Systems     Review of Systems   HENT:  Positive for congestion, ear pain, sinus pressure and sinus pain.    Respiratory:  Negative for chest tightness, shortness of breath and wheezing.    Allergic/Immunologic: Negative for  "environmental allergies.   All other systems reviewed and are negative.        Current Medications       Current Outpatient Medications:     amoxicillin (AMOXIL) 875 mg tablet, Take 1 tablet (875 mg total) by mouth 2 (two) times a day for 10 days, Disp: 20 tablet, Rfl: 0    multivitamin-iron-minerals-folic acid (CENTRUM) chewable tablet, Chew 1 tablet daily, Disp: , Rfl:     nitrofurantoin (MACROBID) 100 mg capsule, take 1 capsule by mouth twice a day for 7 days, Disp: , Rfl:     acetaminophen (TYLENOL) 500 mg tablet, Take 2 tablets (1,000 mg total) by mouth every 8 (eight) hours (Patient not taking: Reported on 8/23/2021), Disp: , Rfl: 0    ibuprofen (MOTRIN) 200 mg tablet, Take 3 tablets (600 mg total) by mouth every 8 (eight) hours (Patient not taking: Reported on 8/23/2021), Disp: , Rfl: 0    Current Allergies     Allergies as of 04/01/2024    (No Known Allergies)              The following portions of the patient's history were reviewed and updated as appropriate: allergies, current medications, past family history, past medical history, past social history, past surgical history and problem list.     Past Medical History:   Diagnosis Date    Faint heart murmur     Raynaud disease        Past Surgical History:   Procedure Laterality Date    ANKLE SURGERY Left     CHOLECYSTECTOMY      CHOLECYSTECTOMY LAPAROSCOPIC N/A 8/6/2021    Procedure: LAPAROSCOPIC CHOLECYSTECTOMY WITH INTRAOP CHOLANGIOGRAM;  Surgeon: Nydia Cook MD;  Location:  MAIN OR;  Service: General    WISDOM TOOTH EXTRACTION         Family History   Problem Relation Age of Onset    CINTHYA disease Mother     Diabetes Father     Hypertension Father     Cholecystitis Maternal Grandmother     Cancer Maternal Grandfather          Medications have been verified.        Objective     /78   Pulse 85   Temp 98.3 °F (36.8 °C)   Resp 18   Ht 5' 1\" (1.549 m)   Wt 96.8 kg (213 lb 6.4 oz)   SpO2 98%   BMI 40.32 kg/m²   No LMP recorded.       "   Physical Exam     Physical Exam  Vitals and nursing note reviewed.   Constitutional:       General: She is not in acute distress.     Appearance: Normal appearance. She is well-developed. She is ill-appearing. She is not toxic-appearing or diaphoretic.   HENT:      Head: Normocephalic and atraumatic.      Right Ear: Ear canal and external ear normal. Tenderness present. No drainage or swelling. A middle ear effusion (slight) is present. Tympanic membrane is not injected or erythematous.      Left Ear: Ear canal and external ear normal. Tenderness present. No drainage or swelling. A middle ear effusion (slight) is present. Tympanic membrane is not injected or erythematous.      Nose: Mucosal edema and congestion present.      Right Sinus: Maxillary sinus tenderness and frontal sinus tenderness present.      Left Sinus: Maxillary sinus tenderness and frontal sinus tenderness present.   Eyes:      Pupils: Pupils are equal, round, and reactive to light.   Pulmonary:      Effort: Pulmonary effort is normal. No respiratory distress.   Abdominal:      General: There is no distension.      Palpations: Abdomen is soft.   Musculoskeletal:         General: Normal range of motion.      Cervical back: Normal range of motion and neck supple.   Lymphadenopathy:      Cervical: Cervical adenopathy present.   Skin:     General: Skin is warm and dry.      Capillary Refill: Capillary refill takes less than 2 seconds.   Neurological:      General: No focal deficit present.      Mental Status: She is alert and oriented to person, place, and time.   Psychiatric:         Mood and Affect: Mood and affect normal.         Behavior: Behavior normal. Behavior is cooperative.         Thought Content: Thought content normal.         Judgment: Judgment normal.

## 2024-05-06 ENCOUNTER — OFFICE VISIT (OUTPATIENT)
Dept: URGENT CARE | Facility: CLINIC | Age: 49
End: 2024-05-06
Payer: COMMERCIAL

## 2024-05-06 VITALS
WEIGHT: 215 LBS | BODY MASS INDEX: 40.59 KG/M2 | SYSTOLIC BLOOD PRESSURE: 158 MMHG | DIASTOLIC BLOOD PRESSURE: 90 MMHG | OXYGEN SATURATION: 100 % | HEIGHT: 61 IN | TEMPERATURE: 98.2 F | HEART RATE: 79 BPM | RESPIRATION RATE: 18 BRPM

## 2024-05-06 DIAGNOSIS — N30.01 ACUTE CYSTITIS WITH HEMATURIA: Primary | ICD-10-CM

## 2024-05-06 DIAGNOSIS — R30.0 DYSURIA: ICD-10-CM

## 2024-05-06 LAB
SL AMB  POCT GLUCOSE, UA: NEGATIVE
SL AMB LEUKOCYTE ESTERASE,UA: ABNORMAL
SL AMB POCT BILIRUBIN,UA: NEGATIVE
SL AMB POCT BLOOD,UA: ABNORMAL
SL AMB POCT CLARITY,UA: ABNORMAL
SL AMB POCT COLOR,UA: ABNORMAL
SL AMB POCT KETONES,UA: NEGATIVE
SL AMB POCT NITRITE,UA: NEGATIVE
SL AMB POCT PH,UA: 5
SL AMB POCT SPECIFIC GRAVITY,UA: 1.02
SL AMB POCT URINE PROTEIN: NEGATIVE
SL AMB POCT UROBILINOGEN: 0.2

## 2024-05-06 PROCEDURE — 87086 URINE CULTURE/COLONY COUNT: CPT | Performed by: NURSE PRACTITIONER

## 2024-05-06 PROCEDURE — 87077 CULTURE AEROBIC IDENTIFY: CPT | Performed by: NURSE PRACTITIONER

## 2024-05-06 PROCEDURE — 99212 OFFICE O/P EST SF 10 MIN: CPT | Performed by: NURSE PRACTITIONER

## 2024-05-06 PROCEDURE — 81002 URINALYSIS NONAUTO W/O SCOPE: CPT | Performed by: NURSE PRACTITIONER

## 2024-05-06 PROCEDURE — 87186 SC STD MICRODIL/AGAR DIL: CPT | Performed by: NURSE PRACTITIONER

## 2024-05-06 RX ORDER — CIPROFLOXACIN 500 MG/1
500 TABLET, FILM COATED ORAL EVERY 12 HOURS SCHEDULED
Qty: 14 TABLET | Refills: 0 | Status: SHIPPED | OUTPATIENT
Start: 2024-05-06 | End: 2024-05-13

## 2024-05-06 NOTE — PROGRESS NOTES
[facility]        NAME: Lora Gracia is a 48 y.o. female  : 1975    MRN: [id number]  DATE: May 6, 2024  TIME: 1:02 PM      Assessment and Plan     Acute cystitis with hematuria [N30.01]  1. Acute cystitis with hematuria  POCT urine dip    Urine culture    ciprofloxacin (CIPRO) 500 mg tablet      2. Dysuria  POCT urine dip    Urine culture    ciprofloxacin (CIPRO) 500 mg tablet            Patient Instructions     Patient Instructions   Urinary Tract Infection in Women   AMBULATORY CARE:   A urinary tract infection (UTI)  is caused by bacteria that get inside your urinary tract. Your urinary tract includes your kidneys, ureters, bladder, and urethra. A UTI is more common in your lower urinary tract, which includes your bladder and urethra.       Common symptoms include the following:   Urinating more often or waking from sleep to urinate    Pain or burning when you urinate    Pain or pressure in your lower abdomen and back    Urine that smells bad    Blood in your urine    Leaking urine    Seek care immediately if:   You are urinating very little or not at all.    You have a high fever with shaking chills.    You have side or back pain that gets worse.    Call your doctor if:   You have a fever.    You do not feel better after 2 days of taking antibiotics.    You have new symptoms, such as blood or pus in your urine.    You are vomiting.    You have questions or concerns about your condition or care.    Treatment for a UTI  may include antibiotics to treat a bacterial infection. You may also need medicines to decrease pain and burning, or decrease the urge to urinate often. If you have UTIs often (called recurrent UTIs), you may be given antibiotics to take regularly. You will be given directions for when and how to use antibiotics. The goal is to prevent UTIs but not cause antibiotic resistance by using antibiotics too often.  Prevent a UTI:   Empty your bladder often.  Urinate and empty your  bladder as soon as you feel the need. Do not hold your urine for long periods of time.    Wipe from front to back after you urinate or have a bowel movement.  This will help prevent germs from getting into your urinary tract through your urethra.    Drink liquids as directed.  Ask how much liquid to drink each day and which liquids are best for you. You may need to drink more liquids than usual to help flush out the bacteria. Do not drink alcohol, caffeine, or citrus juices. These can irritate your bladder and increase your symptoms. Your healthcare provider may recommend cranberry juice to help prevent a UTI.    Urinate before and after you have sex.  This can help flush out bacteria passed during sex.    Do not douche or use feminine deodorants.  These can change the chemical balance in your vagina.    Change sanitary pads or tampons often.  This will help prevent germs from getting into your urinary tract.    Talk to your healthcare provider about your birth control method.  You may need to change your method if it is increasing your risk for UTIs.    Wear cotton underwear and clothes that are loose.  Tight pants and nylon underwear can trap moisture and cause bacteria to grow.    Vaginal estrogen may be recommended.  This medicine helps prevent UTIs in women who have gone through menopause or are in ketan-menopause.    Do pelvic muscle exercises often.  Pelvic muscle exercises may help you start and stop urinating. Strong pelvic muscles may help you empty your bladder easier. Squeeze these muscles tightly for 5 seconds like you are trying to hold back urine. Then relax for 5 seconds. Gradually work up to squeezing for 10 seconds. Do 3 sets of 15 repetitions a day, or as directed.    Follow up with your doctor as directed:  Write down your questions so you remember to ask them during your visits.  © Copyright [employer] 2023 Information is for End User's use only and may not be sold, redistributed or otherwise used  for commercial purposes.  The above information is an  only. It is not intended as medical advice for individual conditions or treatments. Talk to your doctor, nurse or pharmacist before following any medical regimen to see if it is safe and effective for you.    Hematuria   AMBULATORY CARE:   Hematuria  is blood in your urine. Your urine may be bright red to dark brown.  Signs and symptoms:   Fever    Nausea and vomiting    Pain or bruising on your lower back or sides    Pain or burning when you urinate    More urination than usual, or the need to urinate right away    Blood clots in the toilet after you urinate    Seek care immediately if:   You have blood in your urine after a new injury, such as a fall.    You have severe back or side pain that does not go away with treatment.    Call your doctor if:   You are urinating very small amounts or not at all.    You feel like you cannot empty your bladder.    You have a fever that gets worse or does not go away with treatment.    You cannot keep liquids or medicines down.    Your urine gets darker, even after you drink extra liquids.    You have questions or concerns about your condition, treatment, or care.    Treatment for hematuria  depends on the cause of your hematuria. Treatment may not be needed. You may need medicines to treat an infection. Ask your healthcare provider for more information about the treatment you may need.  Drink liquids as directed:  You may need to drink extra liquids to help flush the blood from your body through your urine. Water is the best liquid to drink. Ask how much liquid to drink each day and which liquids are best for you.  Follow up with your doctor as directed:  Write down your questions so you remember to ask them during your visits.  © Copyright [employer] 2023 Information is for End User's use only and may not be sold, redistributed or otherwise used for commercial purposes.  The above information is an   only. It is not intended as medical advice for individual conditions or treatments. Talk to your doctor, nurse or pharmacist before following any medical regimen to see if it is safe and effective for you.      Follow up with PCP in 3-5 days.  Proceed to  ER if symptoms worsen.    Chief Complaint     Chief Complaint   Patient presents with    Possible UTI     Patient c/o hematuria, dysuria, and urinary frequency that started this morning at 0230.           History of Present Illness     Patient had UTI symptoms late March and utilized telemedicine through her employer.  She started Macrobid March 21 which she took until completed.  She was seen at the beginning of April by myself and treated with amoxicillin for sinusitis.  Her urine symptoms did seem to resolve until onset this morning at 2:30 in the morning of burning, frequency and some gross hematuria.  She has had some hematuria with prior UTIs, but notes that this persisted for a good hour or so before leading up.  We discussed indications to be seen in the ER.  We discussed that Macrobid is a first-line antibiotic for UTIs, but sometimes there is resistance.  We discussed that the amoxicillin covers the sinuses better, but it has weaker action against a UTI and also sometimes has resistance.  Today's UTI could be recurrence of the original UTI that was treated only 6 weeks ago with these 2 antibiotics keeping it at bay versus a new onset UTI.  Patient denies history of frequent UTIs and states except the last 6 weeks she is usually pretty healthy.        Review of Systems     Review of Systems   Constitutional:  Negative for diaphoresis and fever.   Genitourinary:  Positive for dysuria, flank pain, frequency, hematuria and pelvic pain. Negative for urgency.   All other systems reviewed and are negative.        Current Medications       Current Outpatient Medications:     ciprofloxacin (CIPRO) 500 mg tablet, Take 1 tablet (500 mg total) by  "mouth every 12 (twelve) hours for 7 days, Disp: 14 tablet, Rfl: 0    acetaminophen (TYLENOL) 500 mg tablet, Take 2 tablets (1,000 mg total) by mouth every 8 (eight) hours (Patient not taking: Reported on 8/23/2021), Disp: , Rfl: 0    ibuprofen (MOTRIN) 200 mg tablet, Take 3 tablets (600 mg total) by mouth every 8 (eight) hours (Patient not taking: Reported on 8/23/2021), Disp: , Rfl: 0    multivitamin-iron-minerals-folic acid (CENTRUM) chewable tablet, Chew 1 tablet daily (Patient not taking: Reported on 5/6/2024), Disp: , Rfl:     nitrofurantoin (MACROBID) 100 mg capsule, take 1 capsule by mouth twice a day for 7 days (Patient not taking: Reported on 5/6/2024), Disp: , Rfl:     Current Allergies     Allergies as of 05/06/2024    (No Known Allergies)              The following portions of the patient's history were reviewed and updated as appropriate: allergies, current medications, past family history, past medical history, past social history, past surgical history and problem list.     Past Medical History:   Diagnosis Date    Faint heart murmur     Raynaud disease        Past Surgical History:   Procedure Laterality Date    ANKLE SURGERY Left     CHOLECYSTECTOMY      CHOLECYSTECTOMY LAPAROSCOPIC N/A 8/6/2021    Procedure: LAPAROSCOPIC CHOLECYSTECTOMY WITH INTRAOP CHOLANGIOGRAM;  Surgeon: Nydia Cook MD;  Location: [facility] OR;  Service: General    WISDOM TOOTH EXTRACTION         Family History   Problem Relation Age of Onset    CINTHYA disease Mother     Diabetes Father     Hypertension Father     Cholecystitis Maternal Grandmother     Cancer Maternal Grandfather          Medications have been verified.        Objective     /90   Pulse 79   Temp 98.2 °F (36.8 °C) (Temporal)   Resp 18   Ht 5' 1\" (1.549 m)   Wt 97.5 kg (215 lb)   LMP  (Exact Date)   SpO2 100%   BMI 40.62 kg/m²   No LMP recorded (exact date). Patient is premenopausal.         Physical Exam     Physical Exam  Vitals and nursing note " reviewed.   Constitutional:       General: She is not in acute distress.     Appearance: Normal appearance. She is well-developed. She is not ill-appearing, toxic-appearing or diaphoretic.   HENT:      Head: Normocephalic and atraumatic.   Eyes:      Pupils: Pupils are equal, round, and reactive to light.   Pulmonary:      Effort: Pulmonary effort is normal. No respiratory distress.   Abdominal:      General: There is no distension.      Palpations: Abdomen is soft.      Tenderness: There is abdominal tenderness in the suprapubic area. There is right CVA tenderness (slight) and left CVA tenderness (slight).      Comments: Mild bilateral CVA tenderness but no proteinuria   Musculoskeletal:         General: Normal range of motion.      Cervical back: Normal range of motion and neck supple.   Skin:     General: Skin is warm and dry.      Capillary Refill: Capillary refill takes less than 2 seconds.   Neurological:      General: No focal deficit present.      Mental Status: She is alert and oriented to person, place, and time.   Psychiatric:         Mood and Affect: Mood and affect normal.         Behavior: Behavior normal. Behavior is cooperative.         Thought Content: Thought content normal.         Judgment: Judgment normal.

## 2024-05-06 NOTE — PATIENT INSTRUCTIONS
Urinary Tract Infection in Women   AMBULATORY CARE:   A urinary tract infection (UTI)  is caused by bacteria that get inside your urinary tract. Your urinary tract includes your kidneys, ureters, bladder, and urethra. A UTI is more common in your lower urinary tract, which includes your bladder and urethra.       Common symptoms include the following:   Urinating more often or waking from sleep to urinate    Pain or burning when you urinate    Pain or pressure in your lower abdomen and back    Urine that smells bad    Blood in your urine    Leaking urine    Seek care immediately if:   You are urinating very little or not at all.    You have a high fever with shaking chills.    You have side or back pain that gets worse.    Call your doctor if:   You have a fever.    You do not feel better after 2 days of taking antibiotics.    You have new symptoms, such as blood or pus in your urine.    You are vomiting.    You have questions or concerns about your condition or care.    Treatment for a UTI  may include antibiotics to treat a bacterial infection. You may also need medicines to decrease pain and burning, or decrease the urge to urinate often. If you have UTIs often (called recurrent UTIs), you may be given antibiotics to take regularly. You will be given directions for when and how to use antibiotics. The goal is to prevent UTIs but not cause antibiotic resistance by using antibiotics too often.  Prevent a UTI:   Empty your bladder often.  Urinate and empty your bladder as soon as you feel the need. Do not hold your urine for long periods of time.    Wipe from front to back after you urinate or have a bowel movement.  This will help prevent germs from getting into your urinary tract through your urethra.    Drink liquids as directed.  Ask how much liquid to drink each day and which liquids are best for you. You may need to drink more liquids than usual to help flush out the bacteria. Do not drink alcohol, caffeine,  or citrus juices. These can irritate your bladder and increase your symptoms. Your healthcare provider may recommend cranberry juice to help prevent a UTI.    Urinate before and after you have sex.  This can help flush out bacteria passed during sex.    Do not douche or use feminine deodorants.  These can change the chemical balance in your vagina.    Change sanitary pads or tampons often.  This will help prevent germs from getting into your urinary tract.    Talk to your healthcare provider about your birth control method.  You may need to change your method if it is increasing your risk for UTIs.    Wear cotton underwear and clothes that are loose.  Tight pants and nylon underwear can trap moisture and cause bacteria to grow.    Vaginal estrogen may be recommended.  This medicine helps prevent UTIs in women who have gone through menopause or are in ketan-menopause.    Do pelvic muscle exercises often.  Pelvic muscle exercises may help you start and stop urinating. Strong pelvic muscles may help you empty your bladder easier. Squeeze these muscles tightly for 5 seconds like you are trying to hold back urine. Then relax for 5 seconds. Gradually work up to squeezing for 10 seconds. Do 3 sets of 15 repetitions a day, or as directed.    Follow up with your doctor as directed:  Write down your questions so you remember to ask them during your visits.  © Copyright [employer] 2023 Information is for End User's use only and may not be sold, redistributed or otherwise used for commercial purposes.  The above information is an  only. It is not intended as medical advice for individual conditions or treatments. Talk to your doctor, nurse or pharmacist before following any medical regimen to see if it is safe and effective for you.    Hematuria   AMBULATORY CARE:   Hematuria  is blood in your urine. Your urine may be bright red to dark brown.  Signs and symptoms:   Fever    Nausea and vomiting    Pain or bruising  on your lower back or sides    Pain or burning when you urinate    More urination than usual, or the need to urinate right away    Blood clots in the toilet after you urinate    Seek care immediately if:   You have blood in your urine after a new injury, such as a fall.    You have severe back or side pain that does not go away with treatment.    Call your doctor if:   You are urinating very small amounts or not at all.    You feel like you cannot empty your bladder.    You have a fever that gets worse or does not go away with treatment.    You cannot keep liquids or medicines down.    Your urine gets darker, even after you drink extra liquids.    You have questions or concerns about your condition, treatment, or care.    Treatment for hematuria  depends on the cause of your hematuria. Treatment may not be needed. You may need medicines to treat an infection. Ask your healthcare provider for more information about the treatment you may need.  Drink liquids as directed:  You may need to drink extra liquids to help flush the blood from your body through your urine. Water is the best liquid to drink. Ask how much liquid to drink each day and which liquids are best for you.  Follow up with your doctor as directed:  Write down your questions so you remember to ask them during your visits.  © Copyright [employer] 2023 Information is for End User's use only and may not be sold, redistributed or otherwise used for commercial purposes.  The above information is an  only. It is not intended as medical advice for individual conditions or treatments. Talk to your doctor, nurse or pharmacist before following any medical regimen to see if it is safe and effective for you.

## 2024-05-08 LAB — BACTERIA UR CULT: ABNORMAL

## 2024-05-19 ENCOUNTER — OFFICE VISIT (OUTPATIENT)
Dept: URGENT CARE | Facility: CLINIC | Age: 49
End: 2024-05-19
Payer: COMMERCIAL

## 2024-05-19 VITALS
DIASTOLIC BLOOD PRESSURE: 89 MMHG | OXYGEN SATURATION: 98 % | HEART RATE: 60 BPM | SYSTOLIC BLOOD PRESSURE: 189 MMHG | TEMPERATURE: 98 F | HEIGHT: 61 IN | RESPIRATION RATE: 20 BRPM | WEIGHT: 214 LBS | BODY MASS INDEX: 40.4 KG/M2

## 2024-05-19 DIAGNOSIS — R42 VERTIGO: Primary | ICD-10-CM

## 2024-05-19 DIAGNOSIS — H65.03 NON-RECURRENT ACUTE SEROUS OTITIS MEDIA OF BOTH EARS: ICD-10-CM

## 2024-05-19 PROCEDURE — 99213 OFFICE O/P EST LOW 20 MIN: CPT | Performed by: NURSE PRACTITIONER

## 2024-05-19 RX ORDER — PREDNISONE 10 MG/1
TABLET ORAL
Qty: 30 TABLET | Refills: 0 | Status: SHIPPED | OUTPATIENT
Start: 2024-05-19

## 2024-05-19 RX ORDER — MECLIZINE HYDROCHLORIDE 25 MG/1
25 TABLET ORAL ONCE
Status: COMPLETED | OUTPATIENT
Start: 2024-05-19 | End: 2024-05-19

## 2024-05-19 RX ORDER — MECLIZINE HYDROCHLORIDE 25 MG/1
25 TABLET ORAL 3 TIMES DAILY PRN
Qty: 30 TABLET | Refills: 0 | Status: SHIPPED | OUTPATIENT
Start: 2024-05-19

## 2024-05-19 RX ADMIN — MECLIZINE HYDROCHLORIDE 25 MG: 25 TABLET ORAL at 11:43

## 2024-05-19 NOTE — LETTER
May 19, 2024     Patient: Lora Gracia   YOB: 1975   Date of Visit: 5/19/2024       To Whom it May Concern:    Lora Gracia was seen in my clinic on 5/19/2024. She may return to work once symptoms improve for 24 hours, likely between middle of this week and beginning of next.  Please excuse for time missed.    If you have any questions or concerns, please don't hesitate to call.         Sincerely,          ISADORA Bojorquez        CC: No Recipients

## 2024-05-19 NOTE — PATIENT INSTRUCTIONS
Vertigo   AMBULATORY CARE:   Vertigo  is a condition that causes you to feel dizzy. You may feel that you or everything around you is moving or spinning. You may also feel like you are being pulled down or toward your side.   Common symptoms that may happen with vertigo:   Nausea or vomiting    Trouble with your balance    Sensitivity to light or sound    Weakness, slurred speech, problems seeing or moving, or increased sleepiness    Facial weakness and headache    Hearing loss, ear fullness or pain, or hearing ringing sounds    Fast, uncontrolled movement of your eyes    Seek care immediately if:   You have a headache and a stiff neck.    You have shaking chills and a fever.     You vomit over and over with no relief.     You have blood, pus, or fluid coming out of your ears.    You are confused.    Contact your healthcare provider if:   Your symptoms do not get better with treatment.     You have questions about your condition or care.    Treatment for vertigo  may include resting in bed or avoid certain activities for a time. You may need to decrease or stop taking medicines that are causing your vertigo. Medicines may also be prescribed to help relieve your symptoms.   Manage your symptoms:   Do not drive , walk without help, or operate heavy machinery when you are dizzy.     Move slowly  when you move from one position to another position. Get up slowly from sitting or lying down. Sit or lie down right away if you feel dizzy.    Drink plenty of liquids.  Liquids help prevent dehydration. Ask how much liquid to drink each day and which liquids are best for you.    Vestibular and balance rehabilitation therapy (VBRT)  is used to teach you exercises to improve your balance and strength. These exercises may help decrease your vertigo and improve your balance. Ask for more information about this therapy.    Follow up with your doctor as directed:  Write down your questions so you remember to ask them during your  visits.  © Copyright [employer] 2023 Information is for End User's use only and may not be sold, redistributed or otherwise used for commercial purposes.  The above information is an  only. It is not intended as medical advice for individual conditions or treatments. Talk to your doctor, nurse or pharmacist before following any medical regimen to see if it is safe and effective for you.    Fluid In The Ear (Serous Otitis Media)   AMBULATORY CARE:   Serous otitis media (COMPA)  is fluid trapped in the middle of your ear behind your eardrum. This condition usually develops without signs or symptoms of an ear infection. Serous otitis media may be caused by an upper respiratory infection or allergies. It is most common in the fall and early spring.       Signs and symptoms:   Trouble hearing    Sounds are muffled    Plugged ear or an ear that feels full    Ear discomfort or popping    Ringing or buzzing in your ear    Call your doctor if:   You develop severe ear pain.    The outside of your ear is red or swollen.    You have fluid coming from your ear.    You have questions or concerns about your condition or care.    Medicines:  You may need any of the following:  Acetaminophen  decreases pain and fever. It is available without a doctor's order. Ask how much to take and how often to take it. Follow directions. Read the labels of all other medicines you are using to see if they also contain acetaminophen, or ask your doctor or pharmacist. Acetaminophen can cause liver damage if not taken correctly.    NSAIDs , such as ibuprofen, help decrease swelling, pain, and fever. This medicine is available with or without a doctor's order. NSAIDs can cause stomach bleeding or kidney problems in certain people. If you take blood thinner medicine, always ask your healthcare provider if NSAIDs are safe for you. Always read the medicine label and follow directions.    Steroids  help decrease inflammation so the fluid can  drain from your ear.     Antibiotics  may be needed if a bacterial infection caused your COMPA.    How to stay healthy:   Wash your hands often throughout the day.  Use soap and water. Rub your soapy hands together, lacing your fingers, for at least 20 seconds. Rinse with warm, running water. Dry your hands with a clean towel or paper towel. Use hand  that contains alcohol if soap and water are not available. Teach children how to wash their hands and use hand .         Avoid people who are sick.  Some germs are easily and quickly spread through contact.    Follow up with your doctor as directed:  Write down your questions so you remember to ask them during your visits.   © Copyright [employer] 2023 Information is for End User's use only and may not be sold, redistributed or otherwise used for commercial purposes.  The above information is an  only. It is not intended as medical advice for individual conditions or treatments. Talk to your doctor, nurse or pharmacist before following any medical regimen to see if it is safe and effective for you.

## 2024-05-23 ENCOUNTER — TELEPHONE (OUTPATIENT)
Age: 49
End: 2024-05-23

## 2024-05-29 ENCOUNTER — OFFICE VISIT (OUTPATIENT)
Dept: FAMILY MEDICINE CLINIC | Facility: CLINIC | Age: 49
End: 2024-05-29
Payer: COMMERCIAL

## 2024-05-29 VITALS
DIASTOLIC BLOOD PRESSURE: 86 MMHG | HEIGHT: 61 IN | BODY MASS INDEX: 40.4 KG/M2 | RESPIRATION RATE: 18 BRPM | WEIGHT: 214 LBS | HEART RATE: 88 BPM | TEMPERATURE: 97.9 F | SYSTOLIC BLOOD PRESSURE: 138 MMHG | OXYGEN SATURATION: 99 %

## 2024-05-29 DIAGNOSIS — H81.13 BENIGN PAROXYSMAL POSITIONAL VERTIGO DUE TO BILATERAL VESTIBULAR DISORDER: Primary | ICD-10-CM

## 2024-05-29 DIAGNOSIS — R42 DIZZINESS: ICD-10-CM

## 2024-05-29 PROCEDURE — 99214 OFFICE O/P EST MOD 30 MIN: CPT | Performed by: NURSE PRACTITIONER

## 2024-05-29 NOTE — PROGRESS NOTES
Ambulatory Visit  Name: Lora Gracia      : 1975      MRN: [id number]  Encounter Provider: ISADORA Brennan  Encounter Date: 2024   Encounter department: [facility] PRIMARY CARE    Assessment & Plan   1. Benign paroxysmal positional vertigo due to bilateral vestibular disorder  -     Ambulatory Referral to Otolaryngology; Future  -     Ambulatory Referral to Physical Therapy; Future  2. Dizziness  -     Ambulatory Referral to Otolaryngology; Future  -     Ambulatory Referral to Physical Therapy; Future  - continue flonase, start allergy medication.   - start PT and follow up with ENT for ear symptoms.     Depression Screening and Follow-up Plan: Patient was screened for depression during today's encounter. They screened negative with a PHQ-2 score of 0.        History of Present Illness      Paitnet here with c/o dizziness for the last 10 days.  Used debrox drops, since feeling like her ears needed to pop, then the next morning was getting dizzy and nauseous. Went to urgent care, was given meclizine and prednisone. Meclizine helps a little, took prednisone with no relief. When going to walk having trouble walking. Head feels like its heavier than her body. Any movement causes worsening dizziness.       Review of Systems   Constitutional: Negative.  Negative for fatigue and fever.   HENT:  Positive for ear pain (pressure).    Respiratory: Negative.  Negative for shortness of breath and wheezing.    Cardiovascular: Negative.  Negative for chest pain and palpitations.   Gastrointestinal: Negative.    Musculoskeletal: Negative.  Negative for arthralgias and back pain.   Skin: Negative.  Negative for rash.   Neurological:  Positive for dizziness. Negative for light-headedness and headaches.   Psychiatric/Behavioral: Negative.       Past Medical History:   Diagnosis Date    Faint heart murmur     Raynaud disease      Past Surgical History:   Procedure Laterality Date    ANKLE SURGERY Left   "   CHOLECYSTECTOMY      CHOLECYSTECTOMY LAPAROSCOPIC N/A 8/6/2021    Procedure: LAPAROSCOPIC CHOLECYSTECTOMY WITH INTRAOP CHOLANGIOGRAM;  Surgeon: Nydia Cook MD;  Location:  MAIN OR;  Service: General    WISDOM TOOTH EXTRACTION       Family History   Problem Relation Age of Onset    CINTHYA disease Mother     Diabetes Father     Hypertension Father     Cholecystitis Maternal Grandmother     Cancer Maternal Grandfather      Social History     Tobacco Use    Smoking status: Never    Smokeless tobacco: Never   Vaping Use    Vaping status: Never Used   Substance and Sexual Activity    Alcohol use: Yes     Alcohol/week: 2.0 standard drinks of alcohol     Types: 2 Glasses of wine per week    Drug use: Never    Sexual activity: Yes     Partners: Male     Birth control/protection: Male Sterilization     Current Outpatient Medications on File Prior to Visit   Medication Sig    meclizine (ANTIVERT) 25 mg tablet Take 1 tablet (25 mg total) by mouth 3 (three) times a day as needed for dizziness or nausea    multivitamin-iron-minerals-folic acid (CENTRUM) chewable tablet Chew 1 tablet daily (Patient not taking: Reported on 5/6/2024)    predniSONE 10 mg tablet 5 tabs daily x 2 days, 4 tabs daily x 2 days, 3 tabs daily x 2 days, 2 tabs daily x 2 days, 1 tab daily x 2 days (Patient not taking: Reported on 5/29/2024)    [DISCONTINUED] acetaminophen (TYLENOL) 500 mg tablet Take 2 tablets (1,000 mg total) by mouth every 8 (eight) hours (Patient not taking: Reported on 8/23/2021)    [DISCONTINUED] ibuprofen (MOTRIN) 200 mg tablet Take 3 tablets (600 mg total) by mouth every 8 (eight) hours (Patient not taking: Reported on 8/23/2021)     No Known Allergies  Immunization History   Administered Date(s) Administered    COVID-19 J&J (Diamond) vaccine 0.5 mL 03/16/2021    COVID-19 MODERNA VACC 0.5 ML IM 12/15/2021    Tdap 07/07/2021     Objective     /86   Pulse 88   Temp 97.9 °F (36.6 °C)   Resp 18   Ht 5' 1\" (1.549 m)   Wt 97.1 " kg (214 lb)   LMP  (Exact Date)   SpO2 99%   BMI 40.43 kg/m²     Physical Exam  Vitals and nursing note reviewed.   Constitutional:       General: She is not in acute distress.     Appearance: She is well-developed. She is not ill-appearing or diaphoretic.   HENT:      Head: Normocephalic and atraumatic.      Right Ear: Hearing, tympanic membrane and ear canal normal.      Left Ear: Hearing, tympanic membrane and ear canal normal.      Nose: Nose normal.      Mouth/Throat:      Mouth: Oropharynx is clear and moist.      Pharynx: Uvula midline.   Eyes:      General: Lids are normal.      Extraocular Movements: EOM normal.      Conjunctiva/sclera: Conjunctivae normal.   Cardiovascular:      Rate and Rhythm: Normal rate and regular rhythm.      Heart sounds: Normal heart sounds, S1 normal and S2 normal. No murmur heard.  Pulmonary:      Effort: Pulmonary effort is normal. No respiratory distress.      Breath sounds: Normal breath sounds.   Musculoskeletal:         General: No tenderness or edema. Normal range of motion.   Lymphadenopathy:      Cervical: No cervical adenopathy.   Skin:     General: Skin is warm.      Capillary Refill: Capillary refill takes less than 2 seconds.      Findings: No rash.   Neurological:      Mental Status: She is alert.   Psychiatric:         Mood and Affect: Mood and affect normal.         Behavior: Behavior normal. Behavior is cooperative.         Thought Content: Thought content normal.         Judgment: Judgment normal.       Administrative Statements          06-Feb-2024 21:00

## 2024-05-30 ENCOUNTER — EVALUATION (OUTPATIENT)
Dept: PHYSICAL THERAPY | Facility: CLINIC | Age: 49
End: 2024-05-30
Payer: COMMERCIAL

## 2024-05-30 DIAGNOSIS — H81.13 BENIGN PAROXYSMAL POSITIONAL VERTIGO DUE TO BILATERAL VESTIBULAR DISORDER: ICD-10-CM

## 2024-05-30 DIAGNOSIS — R42 DIZZINESS: ICD-10-CM

## 2024-05-30 DIAGNOSIS — R42 VERTIGO: Primary | ICD-10-CM

## 2024-05-30 PROCEDURE — 97162 PT EVAL MOD COMPLEX 30 MIN: CPT | Performed by: PHYSICAL THERAPIST

## 2024-05-30 PROCEDURE — 97112 NEUROMUSCULAR REEDUCATION: CPT | Performed by: PHYSICAL THERAPIST

## 2024-05-30 NOTE — PROGRESS NOTES
PT Evaluation     Today's date: 2024  Patient name: Lora Gracia  : 1975  MRN: [id number]  Referring provider: Katherin Flanagan CRNP  Dx:   Encounter Diagnosis     ICD-10-CM    1. Vertigo  R42           Start Time: 1515  Stop Time: 1610  Total time in clinic (min): 55 minutes    Assessment    Assessment details: Pt is a 48 y.o. female referred to OPPT for vertigo. Positional testing was WNL in all positions, but she did demo poor vestibular integration for balance per MCTSIB, impaired balance with amb with HN, dizziness with turns, and DVA insufficiency. Pt also demo positive B head thrust test suggestive of bilateral vestibular hypofunction but it was not positive every time tested. Overall findings are suggestive of vestibular dysfunction at this time. Due to above impairments, pt has not been driving and gets dizziness with walking and head turns. Pt would benefit form skilled PT to manage dizziness and improve balance in order to return to PLOF.       Goals  ST weeks  MCTSIB = 30 sec in all conditions to demo improved static balance  FGA improve by 2-3 points to demo improvement in dynamic balance mulu amb with turns and HT/HN    LT-8 weeks  DHI < 31 to demo mild dizziness   Independent with updated HEP to self manage and maintain progress outside of PT          Plan    Planned therapy interventions: neuromuscular re-education, balance and home exercise program    Frequency: 2x week  Duration in weeks: 6  Plan of Care beginning date: 2024  Plan of Care expiration date: 2024  Treatment plan discussed with: PTA and patient        Subjective Evaluation    History of Present Illness  Mechanism of injury: Went to  on  and saw PCP yesterday. Vertigo started on  at night. Got up to open the door but could not walk and concetrate. Put some drops in her ears. Next day, she was significnatly worse and was vomitting from walking. Went to  and was told that she had fluids in her  ears causing the vertigo. Finished the prednisone but does not feel any better. Still taking meclizine which helps. Still feeling dizzy after 2 weeks.     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     Still feels like hearing is not right, hearing echoey. Was told that ears do not look infected yesterday by PCP. Has ENT scheduled next week. Fine if she is sitting, but when she gets up to move and do something, she does not feel steady in her head and eyes. Eyes get blurry. Dizziness is less than what it was initially. Only gets dizzy if she is walking or turning her head. Feels blurry for a second. Does get dizzy with positional changes and rolling. Head feels heavy when she is laying down like her head is slamming on the pillow. Took meclizine this morning which helps. Both ears feel like a dull throb and worse in R.    Denies hx of migraines. Has not been driving since she does not feel comfortable.    Patient Goals  Patient goal: get rid of dizziness  Pain  No pain reported          Objective     Concurrent Complaints  Positive for nausea/motion sickness, tinnitus (not currently but did in last 2 weeks, not sure which ears), visual change (vision gets blurry) and aural fullness (both). Negative for hearing loss, memory loss, poor concentration and peripheral neuropathy  Neuro Exam:     Dizziness  Positive for disequilibrium, vertigo, motion sickness and light-headedness.   Negative for oscillopsia (did have initially, not currently), rocking or swaying, diplopia and floating or swimming.     Exacerbating factors  Positive for bending over, rolling in bed, looking up, walking, turning head, supine to/from sitting and walking in busy environment.   Negative for optokinetic movement.     Oculomotor exam   Oculomotor ROM: WNL  Resting nystagmus: not present   Gaze holding nystagmus: not present left  and not present right  Smooth pursuits: within normal limits and feels blurry  Vertical  "saccades: hypometric and feels blurry  Horizontal saccades: hypometric  and feels blurry  Convergence: normal  Head thrust: left abnormal and right abnormal  Dynamic visual acuity: abnormal  Dynamic head: 4  Static head: 10    Positional testing   Jessica-Hallpike   Left posterior canal: WNL  Right posterior canal: WNL  Roll test   Left horizontal canal: WNL  Right horizontal canal: WNL  Positional testing comment: VOR cx: normal      Balance assessments   MCTSIB   Eyes open level surface: 30  Eyes open foam surface: 30  Eyes closed level surface: 30  Eyes closed foam surface: 21    FGA; 25/30         Insurance: [employer]   Auth # of visits: pending   Expiration date: pending    FOTO: D/C    Re-eval Date: 6/30    Date 5/30       Visit # 1       Pain In        Pain Out        Vitals             Precautions raynaud's       Manuals                                        Neuro Re-Ed         VOR x 1  Standing plain 30\" x 2        Amb with HT/HN        Static balance         Amb with turns         [facility] pick ups                 Education on vestibular system and vestibular hypofunction        Ther Ex                                                                        Ther Activity                        Gait Training                        Modalities                          Access Code: [email or web address]  URL: [email or web address]  Date: 05/30/2024  Prepared by: Winnie Roth    Exercises  - Standing Gaze Stabilization with Head Rotation  - 4 x daily - 7 x weekly - 2 sets - 30 hold  - Standing Gaze Stabilization with Head Nod  - 4 x daily - 7 x weekly - 2 sets - 30 hold  - Walking with Head Rotation  - 1 x daily - 7 x weekly - 4 reps  - Walking with Head Nod  - 1 x daily - 7 x weekly - 4 reps       "

## 2024-06-04 ENCOUNTER — OFFICE VISIT (OUTPATIENT)
Dept: PHYSICAL THERAPY | Facility: CLINIC | Age: 49
End: 2024-06-04
Payer: COMMERCIAL

## 2024-06-04 DIAGNOSIS — R42 DIZZINESS: ICD-10-CM

## 2024-06-04 DIAGNOSIS — R42 VERTIGO: Primary | ICD-10-CM

## 2024-06-04 DIAGNOSIS — H81.13 BENIGN PAROXYSMAL POSITIONAL VERTIGO DUE TO BILATERAL VESTIBULAR DISORDER: ICD-10-CM

## 2024-06-04 PROCEDURE — 97112 NEUROMUSCULAR REEDUCATION: CPT | Performed by: PHYSICAL THERAPIST

## 2024-06-04 NOTE — PROGRESS NOTES
"Daily Note     Today's date: 2024  Patient name: Lora Gracia  : 1975  MRN: [id number]  Referring provider: Katherin Flanagan CRNP  Dx:   Encounter Diagnosis     ICD-10-CM    1. Vertigo  R42       2. Benign paroxysmal positional vertigo due to bilateral vestibular disorder  H81.13       3. Dizziness  R42                      Subjective: Has been compliant with HEP. Does okay with short distances but longer distances does make her dizzy.       Objective: See treatment diary below      Assessment: Progressed to 45 seconds for VOR x 1 which pt tolerated well. She did not have dizziness with H but did have with V for VOR x 1. Overall V head movement made pt more dizzy and she had some path deviation during amb with HN. Initiated VOR cx which also made pt symptomatic. Patient would benefit from continued PT      Plan: Progress treatment as tolerated.       Insurance: [employer]   Auth # of visits: no auth required  Expiration date: no auth required    FOTO: D/C    Re-eval Date:     Date       Visit # 1 2      Pain In        Pain Out        Vitals             Precautions raynaud's       Manuals                                        Neuro Re-Ed         VOR x 1  Standing plain 30\" x 2  Standing plain 45\" x 2       VOR cx  Standing plain 30\" x 2       Amb with HT/HN  40' x 4 continuous     40' x  2 holds          Static balance   FTEC with HT/HN foam 30\" x  2     Tandem stance EC level 30\" x 2       Amb with turns         Cone pick ups                 Education on vestibular system and vestibular hypofunction        Ther Ex                                                                        Ther Activity                        Gait Training                        Modalities                          Access Code: [email or web address]  URL: [email or web address]  Date: 2024  Prepared by: Winnie Gould  - Standing Gaze Stabilization with Head Rotation  - 4 x daily - 7 x weekly - 2 " sets - 30 hold  - Standing Gaze Stabilization with Head Nod  - 4 x daily - 7 x weekly - 2 sets - 30 hold  - Walking with Head Rotation  - 1 x daily - 7 x weekly - 4 reps  - Walking with Head Nod  - 1 x daily - 7 x weekly - 4 reps

## 2024-06-06 ENCOUNTER — OFFICE VISIT (OUTPATIENT)
Dept: PHYSICAL THERAPY | Facility: CLINIC | Age: 49
End: 2024-06-06
Payer: COMMERCIAL

## 2024-06-06 DIAGNOSIS — R42 DIZZINESS: ICD-10-CM

## 2024-06-06 DIAGNOSIS — H81.13 BENIGN PAROXYSMAL POSITIONAL VERTIGO DUE TO BILATERAL VESTIBULAR DISORDER: ICD-10-CM

## 2024-06-06 DIAGNOSIS — R42 VERTIGO: Primary | ICD-10-CM

## 2024-06-06 PROCEDURE — 97112 NEUROMUSCULAR REEDUCATION: CPT | Performed by: PHYSICAL THERAPIST

## 2024-06-06 NOTE — PROGRESS NOTES
"Daily Note     Today's date: 2024  Patient name: Lora Gracia  : 1975  MRN: [id number]  Referring provider: Katherin Flanagan CRNP  Dx:   Encounter Diagnosis     ICD-10-CM    1. Vertigo  R42       2. Benign paroxysmal positional vertigo due to bilateral vestibular disorder  H81.13       3. Dizziness  R42                      Subjective: About the same. No dizziness currently.       Objective: See treatment diary below      Assessment: Only had dizziness with vertical movement during VOR x 1 and VOR cx. During static balance with HT/HN, pt had LOB during HN. Also demo path deviation with amb with HN but no dizziness. Patient would benefit from continued PT      Plan: Progress treatment as tolerated.       Insurance: [employer]   Auth # of visits: no auth required  Expiration date: no auth required    FOTO: D/C    Re-eval Date:     Date      Visit # 1 2 3     Pain In        Pain Out        Vitals             Precautions raynaud's       Manuals                                        Neuro Re-Ed         VOR x 1  Standing plain 30\" x 2  Standing plain 45\" x 2  Standing plain 45\" x 2      VOR cx  Standing plain 30\" x 2  Standing plain 45\" x 2      Amb with HT/HN  40' x 4 continuous     40' x  2 holds     40' x 4 continuous     40' x  2 holds     Static balance   FTEC with HT/HN foam 30\" x  2     Tandem stance EC level 30\" x 2  FTEC with HT/HN foam 30\" x  2     Tandem stance EC level 30\" x 2      Amb with turns    180 deg 40' x 4     Cone pick ups                 Education on vestibular system and vestibular hypofunction        Ther Ex                                                                        Ther Activity                        Gait Training                        Modalities                          Access Code: [email or web address]  URL: [email or web address]  Date: 2024  Prepared by: Winnie Gould  - Standing Gaze Stabilization with Head Rotation  - 4 x daily " - 7 x weekly - 2 sets - 30 hold  - Standing Gaze Stabilization with Head Nod  - 4 x daily - 7 x weekly - 2 sets - 30 hold  - Walking with Head Rotation  - 1 x daily - 7 x weekly - 4 reps  - Walking with Head Nod  - 1 x daily - 7 x weekly - 4 reps

## 2024-06-10 ENCOUNTER — OFFICE VISIT (OUTPATIENT)
Dept: PHYSICAL THERAPY | Facility: CLINIC | Age: 49
End: 2024-06-10
Payer: COMMERCIAL

## 2024-06-10 DIAGNOSIS — R42 DIZZINESS: ICD-10-CM

## 2024-06-10 DIAGNOSIS — R42 VERTIGO: Primary | ICD-10-CM

## 2024-06-10 DIAGNOSIS — H81.13 BENIGN PAROXYSMAL POSITIONAL VERTIGO DUE TO BILATERAL VESTIBULAR DISORDER: ICD-10-CM

## 2024-06-10 PROCEDURE — 97112 NEUROMUSCULAR REEDUCATION: CPT | Performed by: PHYSICAL THERAPIST

## 2024-06-10 NOTE — PROGRESS NOTES
"Daily Note     Today's date: 6/10/2024  Patient name: Lora Gracia  : 1975  MRN: [id number]  Referring provider: Katherin Flanagan CRNP  Dx:   Encounter Diagnosis     ICD-10-CM    1. Vertigo  R42       2. Benign paroxysmal positional vertigo due to bilateral vestibular disorder  H81.13       3. Dizziness  R42                      Subjective: No dizziness currently.       Objective: See treatment diary below      Assessment: Progressed to busy background which was challenging for pt since she was more symptomatic when performing. Had more difficulty with static balance as well. Patient would benefit from continued PT      Plan: Progress treatment as tolerated.       Insurance: [employer]   Auth # of visits: no auth required  Expiration date: no auth required    FOTO: D/C    Re-eval Date:     Date 5/30 6/4 6/6 6/10    Visit # 1 2 3 4    Pain In        Pain Out        Vitals             Precautions raynaud's       Manuals                                        Neuro Re-Ed         VOR x 1  Standing plain 30\" x 2  Standing plain 45\" x 2  Standing plain 45\" x 2  Standing busy 45\" x 2     VOR cx  Standing plain 30\" x 2  Standing plain 45\" x 2  Standing busy 45\" x 2     Amb with HT/HN  40' x 4 continuous     40' x  2 holds     40' x 4 continuous     40' x  2 holds 40' x 4 continuous     40' x  2 holds    Static balance   FTEC with HT/HN foam 30\" x  2     Tandem stance EC level 30\" x 2  FTEC with HT/HN foam 30\" x  2     Tandem stance EC level 30\" x 2  FTEC with HT/HN foam 30\" x  2     Tandem stance EC level 30\" x 2     Amb with turns    180 deg 40' x 4 180 deg 40' x 2    Cone pick ups    Blazepods, up/down and side to side UE taps 30x     No dizziness              Education on vestibular system and vestibular hypofunction        Ther Ex                                                                        Ther Activity                        Gait Training                        Modalities                      "     Access Code: [email or web address]  URL: [email or web address]  Date: 05/30/2024  Prepared by: Winnie Roth    Exercises  - Standing Gaze Stabilization with Head Rotation  - 4 x daily - 7 x weekly - 2 sets - 30 hold  - Standing Gaze Stabilization with Head Nod  - 4 x daily - 7 x weekly - 2 sets - 30 hold  - Walking with Head Rotation  - 1 x daily - 7 x weekly - 4 reps  - Walking with Head Nod  - 1 x daily - 7 x weekly - 4 reps

## 2024-06-12 ENCOUNTER — OFFICE VISIT (OUTPATIENT)
Dept: PHYSICAL THERAPY | Facility: CLINIC | Age: 49
End: 2024-06-12
Payer: COMMERCIAL

## 2024-06-12 DIAGNOSIS — R42 DIZZINESS: ICD-10-CM

## 2024-06-12 DIAGNOSIS — R42 VERTIGO: Primary | ICD-10-CM

## 2024-06-12 DIAGNOSIS — H81.13 BENIGN PAROXYSMAL POSITIONAL VERTIGO DUE TO BILATERAL VESTIBULAR DISORDER: ICD-10-CM

## 2024-06-12 PROCEDURE — 97112 NEUROMUSCULAR REEDUCATION: CPT | Performed by: PHYSICAL THERAPIST

## 2024-06-12 NOTE — PROGRESS NOTES
"Daily Note     Today's date: 2024  Patient name: Lora Gracia  : 1975  MRN: [id number]  Referring provider: Katherin Flanagan CRNP  Dx:   Encounter Diagnosis     ICD-10-CM    1. Vertigo  R42       2. Benign paroxysmal positional vertigo due to bilateral vestibular disorder  H81.13       3. Dizziness  R42                      Subjective: Tired from doing yard work.       Objective: See treatment diary below      Assessment: Pt was not symptomatic with VOR x 1 and VOR cx, demoing improved tolerance so progressed to VOR x 2 in which she was somewhat symptomatic. She also demo improved static balance since exercises were progressed. Patient would benefit from continued PT      Plan: Progress treatment as tolerated.       Insurance: [employer]   Auth # of visits: no auth required  Expiration date: no auth required    FOTO: D/C    Re-eval Date:     Date 5/30 6/4 6/6 6/10 6/12   Visit # 1 2 3 4 5   Pain In        Pain Out        Vitals             Precautions raynaud's       Manuals                                        Neuro Re-Ed         VOR x 1  Standing plain 30\" x 2  Standing plain 45\" x 2  Standing plain 45\" x 2  Standing busy 45\" x 2  Standing busy 45\" x 2    VOR cx  Standing plain 30\" x 2  Standing plain 45\" x 2  Standing busy 45\" x 2  Standing busy 45\" x 2    Amb with HT/HN  40' x 4 continuous     40' x  2 holds     40' x 4 continuous     40' x  2 holds 40' x 4 continuous     40' x  2 holds 40' x 4 continuous     40' x  2 holds    Fw/bw   VOR x 2     Standing plain, 30\" x 2    Static balance   FTEC with HT/HN foam 30\" x  2     Tandem stance EC level 30\" x 2  FTEC with HT/HN foam 30\" x  2     Tandem stance EC level 30\" x 2  FTEC with HT/HN foam 30\" x  2     Tandem stance EC level 30\" x 2  FTEC with HT/HN foam 30\" x  2     Tandem stance EC level 30\" x 2     Semitandem EC foam 30\"x 2    Amb with turns    180 deg 40' x 4 180 deg 40' x 2 180 deg 40' x 4 EC    Cone pick ups    Blazepods, up/down and " side to side UE taps 30x     No dizziness              Education on vestibular system and vestibular hypofunction        Ther Ex                                                                        Ther Activity                        Gait Training                        Modalities                          Access Code: [email or web address]  URL: [email or web address]  Date: 05/30/2024  Prepared by: Winnie Roth    Exercises  - Standing Gaze Stabilization with Head Rotation  - 4 x daily - 7 x weekly - 2 sets - 30 hold  - Standing Gaze Stabilization with Head Nod  - 4 x daily - 7 x weekly - 2 sets - 30 hold  - Walking with Head Rotation  - 1 x daily - 7 x weekly - 4 reps  - Walking with Head Nod  - 1 x daily - 7 x weekly - 4 reps

## 2024-06-17 ENCOUNTER — APPOINTMENT (OUTPATIENT)
Dept: PHYSICAL THERAPY | Facility: CLINIC | Age: 49
End: 2024-06-17
Payer: COMMERCIAL

## 2024-06-18 ENCOUNTER — OFFICE VISIT (OUTPATIENT)
Dept: PHYSICAL THERAPY | Facility: CLINIC | Age: 49
End: 2024-06-18
Payer: COMMERCIAL

## 2024-06-18 DIAGNOSIS — R42 VERTIGO: Primary | ICD-10-CM

## 2024-06-18 DIAGNOSIS — R42 DIZZINESS: ICD-10-CM

## 2024-06-18 DIAGNOSIS — H81.13 BENIGN PAROXYSMAL POSITIONAL VERTIGO DUE TO BILATERAL VESTIBULAR DISORDER: ICD-10-CM

## 2024-06-18 PROCEDURE — 97112 NEUROMUSCULAR REEDUCATION: CPT | Performed by: PHYSICAL THERAPIST

## 2024-06-18 NOTE — PROGRESS NOTES
"Daily Note     Today's date: 2024  Patient name: Lora Gracia  : 1975  MRN: [id number]  Referring provider: Katherin Flanagan CRNP  Dx:   Encounter Diagnosis     ICD-10-CM    1. Vertigo  R42       2. Benign paroxysmal positional vertigo due to bilateral vestibular disorder  H81.13       3. Dizziness  R42                      Subjective: Still gets jumpy vision when walking around. Did not keep up with exercises while on vacation.       Objective: See treatment diary below      Assessment: Tolerated new progressions well. Had more difficulty concentrating on her balance with VOR cx V but overall demo good balance and was not symptomatic. She was not symptomatic with any exercises today. Patient would benefit from continued PT      Plan: Progress treatment as tolerated.       Insurance: [employer]   Auth # of visits: no auth required  Expiration date: no auth required    FOTO: D/C    Re-eval Date:     Date 6/18  6/6 6/10 6/12   Visit # 6  3 4 5   Pain In        Pain Out        Vitals             Precautions raynaud's       Manuals                                        Neuro Re-Ed         VOR x 1  Standing busy 45\" x 1   With gait 60\" x 1   Standing plain 45\" x 2  Standing busy 45\" x 2  Standing busy 45\" x 2    VOR cx With gait 60\" x 1  Standing plain 45\" x 2  Standing busy 45\" x 2  Standing busy 45\" x 2    Amb with HT/HN 40' x 2 fw/bw   Continuous and with holds  40' x 4 continuous     40' x  2 holds 40' x 4 continuous     40' x  2 holds 40' x 4 continuous     40' x  2 holds    Fw/bw   VOR x 2 Standing plain, 45\" x 2     Standing plain, 30\" x 2    Static balance  FTEC with HT/HN foam 30\" x  1     Semitandem EC foam with HT/HN 30\"x 2     Tandem stance EC level 30\" x 1   FTEC with HT/HN foam 30\" x  2     Tandem stance EC level 30\" x 2  FTEC with HT/HN foam 30\" x  2     Tandem stance EC level 30\" x 2  FTEC with HT/HN foam 30\" x  2     Tandem stance EC level 30\" x 2     Semitandem EC foam 30\"x 2    Amb " with turns  180 deg 40' x 2 EC   180 deg 40' x 4 180 deg 40' x 2 180 deg 40' x 4 EC    Cone pick ups    Blazepods, up/down and side to side UE taps 30x     No dizziness                     Ther Ex                                                                        Ther Activity                        Gait Training                        Modalities                          Access Code: [email or web address]  URL: [email or web address]  Date: 05/30/2024  Prepared by: Winnie Roth    Exercises  - Standing Gaze Stabilization with Head Rotation  - 4 x daily - 7 x weekly - 2 sets - 30 hold  - Standing Gaze Stabilization with Head Nod  - 4 x daily - 7 x weekly - 2 sets - 30 hold  - Walking with Head Rotation  - 1 x daily - 7 x weekly - 4 reps  - Walking with Head Nod  - 1 x daily - 7 x weekly - 4 reps

## 2024-06-19 ENCOUNTER — EVALUATION (OUTPATIENT)
Dept: PHYSICAL THERAPY | Facility: CLINIC | Age: 49
End: 2024-06-19
Payer: COMMERCIAL

## 2024-06-19 DIAGNOSIS — R42 VERTIGO: ICD-10-CM

## 2024-06-19 DIAGNOSIS — R42 DIZZINESS: Primary | ICD-10-CM

## 2024-06-19 DIAGNOSIS — H81.13 BENIGN PAROXYSMAL POSITIONAL VERTIGO DUE TO BILATERAL VESTIBULAR DISORDER: ICD-10-CM

## 2024-06-19 PROCEDURE — 97112 NEUROMUSCULAR REEDUCATION: CPT | Performed by: PHYSICAL THERAPIST

## 2024-06-19 NOTE — PROGRESS NOTES
Progress Note     Today's date: 2024  Patient name: Lora Gracia  : 1975  MRN: [id number]  Referring provider: Katherin Flanagan CRNP  Dx:   Encounter Diagnosis     ICD-10-CM    1. Dizziness  R42       2. Vertigo  R42                      Subjective: No longer having vertigo. Still feels off balance and gets blurry vision only when she is walking longer distances and looking further away. Not have dizziness or lightheadedness anymore. Denies any pain. Thinks PT has helped.     Patient Goals  Patient goal: get rid of dizziness    Objective: See treatment diary below      Assessment: PN completed this session. Pt had subjective report of continued difficulty with vision and unsteadiness when ambulating longer distances and looking further out but overall sxs have improved. Reassessed oculomotor testing which was normal and she no longer had c/o blurriness. She also demo improved VOR function and normal DVA testing. Balance both dynamically and statically has improved significantly and is WNL. At this time, she met most of her goals and independent with HEP so will be d/c today. Discussed progressions to HEP in which pt reported good understanding.        Goals  ST weeks  MCTSIB = 30 sec in all conditions to demo improved static balance - MET  FGA improve by 2-3 points to demo improvement in dynamic balance mulu amb with turns and HT/HN - MET     LT-8 weeks  DHI < 31 to demo mild dizziness - Not assessed   Independent with updated HEP to self manage and maintain progress outside of PT - MET        Plan - discharge with HEP     Planned therapy interventions: neuromuscular re-education, balance and home exercise program  Plan of Care beginning date: 2024  Plan of Care expiration date: 2024  Treatment plan discussed with: PTA and patient    Oculomotor exam   Oculomotor ROM: WNL  Resting nystagmus: not present   Gaze holding nystagmus: not present left  and not present right  Smooth  "pursuits: within normal limits  Vertical saccades: normal  Horizontal saccades: normal   Convergence: normal  Head thrust: normal B   Dynamic visual acuity: normal  Dynamic head: 10  Static head: 10        Balance assessments   MCTSIB   Eyes open level surface: 30  Eyes open foam surface: 30  Eyes closed level surface: 30  Eyes closed foam surface: 30, prev 21     FGA: IE: 25/30, 6/19: 29/30     Insurance: [employer]   Auth # of visits: no auth required  Expiration date: no auth required    FOTO: D/C    Re-eval Date: 6/30    Date 6/18 6/19      Visit # 6 7      Pain In        Pain Out        Vitals             Precautions raynaud's       Manuals                                        Neuro Re-Ed         VOR x 1  Standing busy 45\" x 1   With gait 60\" x 1  With gait 60\" x 1   Standing busy 45\" x 2    VOR cx With gait 60\" x 1 With gait 60\" x 1   Standing busy 45\" x 2    Amb with HT/HN 40' x 2 fw/bw   Continuous and with holds    40' x 4 continuous     40' x  2 holds    Fw/bw   VOR x 2 Standing plain, 45\" x 2  Standing busy, 45\" x 2    Standing plain, 30\" x 2    Static balance  FTEC with HT/HN foam 30\" x  1     Semitandem EC foam with HT/HN 30\"x 2     Tandem stance EC level 30\" x 1     FTEC with HT/HN foam 30\" x  2     Tandem stance EC level 30\" x 2     Semitandem EC foam 30\"x 2    Amb with turns  180 deg 40' x 2 EC     180 deg 40' x 4 EC    Cone pick ups                        Ther Ex                                                                        Ther Activity                        Gait Training                        Modalities                          Access Code: [email or web address]  URL: [email or web address]  Date: 05/30/2024  Prepared by: Winnie Roth    Exercises  - Standing Gaze Stabilization with Head Rotation  - 4 x daily - 7 x weekly - 2 sets - 30 hold  - Standing Gaze Stabilization with Head Nod  - 4 x daily - 7 x weekly - 2 sets - 30 hold  - Walking with Head Rotation  - 1 x daily - 7 x weekly - 4 " reps  - Walking with Head Nod  - 1 x daily - 7 x weekly - 4 reps

## 2025-01-03 ENCOUNTER — APPOINTMENT (OUTPATIENT)
Dept: RADIOLOGY | Facility: CLINIC | Age: 50
End: 2025-01-03
Payer: COMMERCIAL

## 2025-01-03 ENCOUNTER — OFFICE VISIT (OUTPATIENT)
Dept: URGENT CARE | Facility: CLINIC | Age: 50
End: 2025-01-03
Payer: COMMERCIAL

## 2025-01-03 VITALS
OXYGEN SATURATION: 98 % | HEART RATE: 75 BPM | RESPIRATION RATE: 18 BRPM | TEMPERATURE: 98.1 F | DIASTOLIC BLOOD PRESSURE: 89 MMHG | SYSTOLIC BLOOD PRESSURE: 145 MMHG

## 2025-01-03 DIAGNOSIS — J02.9 SORE THROAT: Primary | ICD-10-CM

## 2025-01-03 DIAGNOSIS — R05.1 ACUTE COUGH: ICD-10-CM

## 2025-01-03 LAB — S PYO AG THROAT QL: NEGATIVE

## 2025-01-03 PROCEDURE — 87070 CULTURE OTHR SPECIMN AEROBIC: CPT

## 2025-01-03 PROCEDURE — 71046 X-RAY EXAM CHEST 2 VIEWS: CPT

## 2025-01-03 PROCEDURE — 99214 OFFICE O/P EST MOD 30 MIN: CPT

## 2025-01-03 PROCEDURE — 87880 STREP A ASSAY W/OPTIC: CPT

## 2025-01-03 RX ORDER — PREDNISONE 20 MG/1
20 TABLET ORAL 2 TIMES DAILY WITH MEALS
Qty: 10 TABLET | Refills: 0 | Status: SHIPPED | OUTPATIENT
Start: 2025-01-03 | End: 2025-01-08

## 2025-01-03 RX ORDER — BENZONATATE 100 MG/1
100 CAPSULE ORAL 3 TIMES DAILY PRN
Qty: 20 CAPSULE | Refills: 0 | Status: SHIPPED | OUTPATIENT
Start: 2025-01-03

## 2025-01-03 RX ORDER — ALBUTEROL SULFATE 90 UG/1
2 INHALANT RESPIRATORY (INHALATION) EVERY 6 HOURS PRN
Qty: 8.5 G | Refills: 0 | Status: SHIPPED | OUTPATIENT
Start: 2025-01-03

## 2025-01-03 NOTE — PATIENT INSTRUCTIONS
Please take Prednisone twice daily for the next 5 days. You have been prescribed a steroid to help with inflammation. Please take this with food and a full glass of water. Limit the use of alcohol and do not take ibuprofen. Taking ibuprofen with steroids can increase the risk of gastrointestinal (GI) bleeding.    Please take Albuterol inhaler every 6 hours as needed for cough/shortness of breath.   Please take Tessalon 3 times daily as needed for cough.     Your rapid strep test in office was negative. No antibiotic is indicated at this time. However, a throat swab will be sent for definitive culture. Results take approximately 48-72 hours to return and may be viewed on [employer]. Please create an account if you do not currently have one. Our office will reach out to you directly with any abnormal results and/or changes to your plan of care, but you can call us with any questions regarding your results.    We have performed some tests on you during your visit with us (CXR). Our office will contact you with these results only if changes need to be made to the care plan previously discussed with you at your visit. You can review your results on [employer] [employer].    While sick, make sure you get lots of rest and increase your fluid intake.   You may use OTC nasal saline spray, Flonase, and Mucinex for mild congestion.   Take Tylenol or Ibuprofen for fever, mild pain, and/or body aches.  Perform salt water gargles, drink warm tea with honey, and use throat lozenges and Chloraseptic spray for sore throat.    You can also apply warm compresses over your sinuses for any sinus pressure.     While you are sick, taking vitamins may help boost your immune system and potentially aid in recovery by supporting your body's natural defense mechanisms: Vitamin D3 2000 IU daily, Vitamin C 1000mg daily , and Multivitamin daily.         If your symptoms do not improve with our current treatment plan or worsen, please  schedule an appointment with your PCP or proceed to the ER.

## 2025-01-03 NOTE — PROGRESS NOTES
[facility]        NAME: Lora Gracia is a 49 y.o. female  : 1975    MRN: [id number]  DATE: January 3, 2025  TIME: 7:34 PM    Assessment and Plan   Sore throat [J02.9]  1. Sore throat  POCT rapid ANTIGEN strepA    Throat culture    Throat culture      2. Acute cough  XR chest pa and lateral    benzonatate (TESSALON PERLES) 100 mg capsule    albuterol (ProAir HFA) 90 mcg/act inhaler    predniSONE 20 mg tablet        Rapid strep performed in office negative.  Will send throat culture.  Chest x-ray obtained in office. Xray imaging reviewed and negative for any acute cardiopulmonary abnormality or consolidation. Pending final read from radiology.  Discussed red flag symptoms with patient and when to proceed to the ED.      Patient Instructions     Patient Instructions   Please take Prednisone twice daily for the next 5 days. You have been prescribed a steroid to help with inflammation. Please take this with food and a full glass of water. Limit the use of alcohol and do not take ibuprofen. Taking ibuprofen with steroids can increase the risk of gastrointestinal (GI) bleeding.    Please take Albuterol inhaler every 6 hours as needed for cough/shortness of breath.   Please take Tessalon 3 times daily as needed for cough.     Your rapid strep test in office was negative. No antibiotic is indicated at this time. However, a throat swab will be sent for definitive culture. Results take approximately 48-72 hours to return and may be viewed on [facility]. Please create an account if you do not currently have one. Our office will reach out to you directly with any abnormal results and/or changes to your plan of care, but you can call us with any questions regarding your results.    We have performed some tests on you during your visit with us (CXR). Our office will contact you with these results only if changes need to be made to the care plan previously discussed with you at your visit. You can  review your results on [facility] My[facility].    While sick, make sure you get lots of rest and increase your fluid intake.   You may use OTC nasal saline spray, Flonase, and Mucinex for mild congestion.   Take Tylenol or Ibuprofen for fever, mild pain, and/or body aches.  Perform salt water gargles, drink warm tea with honey, and use throat lozenges and Chloraseptic spray for sore throat.    You can also apply warm compresses over your sinuses for any sinus pressure.     While you are sick, taking vitamins may help boost your immune system and potentially aid in recovery by supporting your body's natural defense mechanisms: Vitamin D3 2000 IU daily, Vitamin C 1000mg daily , and Multivitamin daily.         If your symptoms do not improve with our current treatment plan or worsen, please schedule an appointment with your PCP or proceed to the ER.        If your symptoms do not improve with our current treatment plan or worsen, please schedule an appointment with your PCP or proceed to the ER.      Chief Complaint     Chief Complaint   Patient presents with   • Sore Throat     Sore throat for 1 day laryngitis since 12/19          History of Present Illness       49-year-old female presents to the clinic for evaluation of sore throat x 1 day.  Patient also reports associated symptoms that have been occurring for the past 2 weeks including sinus congestion, sinus pressure, dry cough, and mild shortness of breath with exertion.  Patient denies any fevers, chills, body aches, nausea, vomiting, diarrhea, chest pain, dizziness, or lightheadedness.  Patient reports taking OTC DayQuil and NyQuil with mild relief of symptoms.      Sore Throat   Associated symptoms include congestion, coughing (dry) and shortness of breath (mild, exertional). Pertinent negatives include no abdominal pain, diarrhea, ear pain, headaches or vomiting.       Review of Systems   Review of Systems   Constitutional:  Negative for appetite change, chills,  fatigue and fever.   HENT:  Positive for congestion, sinus pressure and sore throat. Negative for ear pain and sinus pain.    Eyes:  Negative for discharge and redness.   Respiratory:  Positive for cough (dry) and shortness of breath (mild, exertional). Negative for chest tightness.    Cardiovascular:  Negative for chest pain.   Gastrointestinal:  Negative for abdominal pain, diarrhea, nausea and vomiting.   Musculoskeletal:  Negative for arthralgias and myalgias.   Neurological:  Negative for dizziness, light-headedness and headaches.         Current Medications       Current Outpatient Medications:   •  albuterol (ProAir HFA) 90 mcg/act inhaler, Inhale 2 puffs every 6 (six) hours as needed for wheezing, Disp: 8.5 g, Rfl: 0  •  benzonatate (TESSALON PERLES) 100 mg capsule, Take 1 capsule (100 mg total) by mouth 3 (three) times a day as needed for cough, Disp: 20 capsule, Rfl: 0  •  meclizine (ANTIVERT) 25 mg tablet, Take 1 tablet (25 mg total) by mouth 3 (three) times a day as needed for dizziness or nausea, Disp: 30 tablet, Rfl: 0  •  multivitamin-iron-minerals-folic acid (CENTRUM) chewable tablet, Chew 1 tablet daily, Disp: , Rfl:   •  predniSONE 20 mg tablet, Take 1 tablet (20 mg total) by mouth 2 (two) times a day with meals for 5 days, Disp: 10 tablet, Rfl: 0  •  predniSONE 10 mg tablet, 5 tabs daily x 2 days, 4 tabs daily x 2 days, 3 tabs daily x 2 days, 2 tabs daily x 2 days, 1 tab daily x 2 days (Patient not taking: Reported on 5/29/2024), Disp: 30 tablet, Rfl: 0    Current Allergies     Allergies as of 01/03/2025   • (No Known Allergies)            The following portions of the patient's history were reviewed and updated as appropriate: allergies, current medications, past family history, past medical history, past social history, past surgical history and problem list.     Past Medical History:   Diagnosis Date   • Faint heart murmur    • Raynaud disease        Past Surgical History:   Procedure  Laterality Date   • ANKLE SURGERY Left    • CHOLECYSTECTOMY     • CHOLECYSTECTOMY LAPAROSCOPIC N/A 8/6/2021    Procedure: LAPAROSCOPIC CHOLECYSTECTOMY WITH INTRAOP CHOLANGIOGRAM;  Surgeon: Nydia Cook MD;  Location:  MAIN OR;  Service: General   • WISDOM TOOTH EXTRACTION         Family History   Problem Relation Age of Onset   • CINTHYA disease Mother    • Diabetes Father    • Hypertension Father    • Cholecystitis Maternal Grandmother    • Cancer Maternal Grandfather          Medications have been verified.        Objective   /89   Pulse 75   Temp 98.1 °F (36.7 °C)   Resp 18   SpO2 98%        Physical Exam     Physical Exam  Vitals and nursing note reviewed.   Constitutional:       Appearance: She is well-developed.   HENT:      Head: Normocephalic and atraumatic.      Right Ear: Tympanic membrane and ear canal normal.      Left Ear: Tympanic membrane and ear canal normal.      Nose: Congestion (mild) present.      Mouth/Throat:      Pharynx: Uvula midline. Posterior oropharyngeal erythema (mild) present. No uvula swelling.      Tonsils: No tonsillar exudate or tonsillar abscesses. 1+ on the right. 1+ on the left.   Cardiovascular:      Rate and Rhythm: Normal rate and regular rhythm.      Heart sounds: Normal heart sounds.   Pulmonary:      Effort: Pulmonary effort is normal.      Breath sounds: Normal breath sounds.      Comments: Dry cough noted on exam with deep breaths  Abdominal:      General: Bowel sounds are normal.      Palpations: Abdomen is soft.   Skin:     General: Skin is warm and dry.   Neurological:      General: No focal deficit present.      Mental Status: She is alert.   Psychiatric:         Mood and Affect: Mood normal.         Behavior: Behavior normal.

## 2025-01-05 LAB — BACTERIA THROAT CULT: NORMAL

## 2025-08-12 ENCOUNTER — APPOINTMENT (OUTPATIENT)
Dept: LAB | Facility: CLINIC | Age: 50
End: 2025-08-12
Payer: COMMERCIAL

## 2025-08-12 ENCOUNTER — OFFICE VISIT (OUTPATIENT)
Dept: FAMILY MEDICINE CLINIC | Facility: CLINIC | Age: 50
End: 2025-08-12
Payer: COMMERCIAL

## 2025-08-14 ENCOUNTER — HOSPITAL ENCOUNTER (OUTPATIENT)
Dept: MAMMOGRAPHY | Facility: HOSPITAL | Age: 50
End: 2025-08-14
Attending: NURSE PRACTITIONER
Payer: COMMERCIAL

## 2025-08-20 ENCOUNTER — TELEPHONE (OUTPATIENT)
Dept: FAMILY MEDICINE CLINIC | Facility: CLINIC | Age: 50
End: 2025-08-20

## (undated) DEVICE — GARMENT,MEDLINE,DVT,INT,CALF,FOAM,MED: Brand: MEDLINE

## (undated) DEVICE — SUT VICRYL 0 UR-6 27 IN J603H

## (undated) DEVICE — GLOVE SRG BIOGEL 7

## (undated) DEVICE — CHLORAPREP HI-LITE 26ML ORANGE

## (undated) DEVICE — GAUZE SPONGES,8 PLY: Brand: CURITY

## (undated) DEVICE — ENDOPATH XCEL BLADELESS TROCARS WITH STABILITY SLEEVES: Brand: ENDOPATH XCEL

## (undated) DEVICE — IRRIGATOR DISPOSABLE SUCTION

## (undated) DEVICE — TROCARS: Brand: KII® BALLOON BLUNT TIP SYSTEM

## (undated) DEVICE — ADHESIVE SKIN HIGH VISCOSITY EXOFIN 1ML

## (undated) DEVICE — TUBE SET SMOKE EVAC PNEUMOCLEAR HIGH FLOW

## (undated) DEVICE — TROCAR: Brand: KII FIOS FIRST ENTRY

## (undated) DEVICE — ENDOPOUCH RETRIEVER SPECIMEN RETRIEVAL BAGS: Brand: ENDOPOUCH RETRIEVER

## (undated) DEVICE — SPONGE GAUZE 2 X 2 4PLY STRL

## (undated) DEVICE — SUT MONOCRYL 4-0 PS-2 27 IN Y426H

## (undated) DEVICE — NEEDLE 25G X 1 1/2

## (undated) DEVICE — GLOVE INDICATOR PI UNDERGLOVE SZ 7 BLUE

## (undated) DEVICE — INTENDED FOR TISSUE SEPARATION, AND OTHER PROCEDURES THAT REQUIRE A SHARP SURGICAL BLADE TO PUNCTURE OR CUT.: Brand: BARD-PARKER SAFETY BLADES SIZE 15, STERILE

## (undated) DEVICE — 3M™ TEGADERM™ TRANSPARENT FILM DRESSING FRAME STYLE, 1624W, 2-3/8 IN X 2-3/4 IN (6 CM X 7 CM), 100/CT 4CT/CASE: Brand: 3M™ TEGADERM™

## (undated) DEVICE — SURGICEL 2 X 14

## (undated) DEVICE — 5 MM CURVED DISSECTORS WITH MONOPOLAR CAUTERY: Brand: ENDOPATH

## (undated) DEVICE — DRAPE C-ARM X-RAY

## (undated) DEVICE — LAPAROSCOPIC SCISSORS: Brand: EPIX LAPAROSCOPIC SCISSORS

## (undated) DEVICE — ELECTRODE LAP L HOOK E-Z CLEAN 33CM -0020

## (undated) DEVICE — CLIP ENDO 5MM M/L

## (undated) DEVICE — 4-PORT MANIFOLD: Brand: NEPTUNE 2